# Patient Record
Sex: FEMALE | Race: WHITE | Employment: FULL TIME | URBAN - METROPOLITAN AREA
[De-identification: names, ages, dates, MRNs, and addresses within clinical notes are randomized per-mention and may not be internally consistent; named-entity substitution may affect disease eponyms.]

---

## 2017-02-10 ENCOUNTER — LAB ENCOUNTER (OUTPATIENT)
Dept: LAB | Age: 37
End: 2017-02-10
Attending: INTERNAL MEDICINE
Payer: COMMERCIAL

## 2017-02-10 ENCOUNTER — OFFICE VISIT (OUTPATIENT)
Dept: INTERNAL MEDICINE CLINIC | Facility: CLINIC | Age: 37
End: 2017-02-10

## 2017-02-10 VITALS
SYSTOLIC BLOOD PRESSURE: 122 MMHG | OXYGEN SATURATION: 97 % | HEART RATE: 72 BPM | BODY MASS INDEX: 26.31 KG/M2 | HEIGHT: 62 IN | DIASTOLIC BLOOD PRESSURE: 70 MMHG | TEMPERATURE: 98 F | WEIGHT: 143 LBS | RESPIRATION RATE: 16 BRPM

## 2017-02-10 DIAGNOSIS — R21 GENERALIZED MACULOPAPULAR RASH: Primary | ICD-10-CM

## 2017-02-10 DIAGNOSIS — R21 GENERALIZED MACULOPAPULAR RASH: ICD-10-CM

## 2017-02-10 LAB
ALBUMIN SERPL-MCNC: 4 G/DL (ref 3.5–4.8)
ALP LIVER SERPL-CCNC: 56 U/L (ref 37–98)
ALT SERPL-CCNC: 24 U/L (ref 14–54)
AST SERPL-CCNC: 21 U/L (ref 15–41)
BASOPHILS # BLD AUTO: 0.05 X10(3) UL (ref 0–0.1)
BASOPHILS NFR BLD AUTO: 0.8 %
BILIRUB SERPL-MCNC: 0.6 MG/DL (ref 0.1–2)
BUN BLD-MCNC: 13 MG/DL (ref 8–20)
CALCIUM BLD-MCNC: 9 MG/DL (ref 8.3–10.3)
CHLORIDE: 103 MMOL/L (ref 101–111)
CO2: 26 MMOL/L (ref 22–32)
CREAT BLD-MCNC: 0.72 MG/DL (ref 0.55–1.02)
EOSINOPHIL # BLD AUTO: 0.1 X10(3) UL (ref 0–0.3)
EOSINOPHIL NFR BLD AUTO: 1.6 %
ERYTHROCYTE [DISTWIDTH] IN BLOOD BY AUTOMATED COUNT: 11.6 % (ref 11.5–16)
GLUCOSE BLD-MCNC: 80 MG/DL (ref 70–99)
HCT VFR BLD AUTO: 40.2 % (ref 34–50)
HGB BLD-MCNC: 13.8 G/DL (ref 12–16)
IMMATURE GRANULOCYTE COUNT: 0.01 X10(3) UL (ref 0–1)
IMMATURE GRANULOCYTE RATIO %: 0.2 %
LYMPHOCYTES # BLD AUTO: 1.75 X10(3) UL (ref 0.9–4)
LYMPHOCYTES NFR BLD AUTO: 27.8 %
M PROTEIN MFR SERPL ELPH: 7.8 G/DL (ref 6.1–8.3)
MCH RBC QN AUTO: 32.2 PG (ref 27–33.2)
MCHC RBC AUTO-ENTMCNC: 34.3 G/DL (ref 31–37)
MCV RBC AUTO: 93.9 FL (ref 81–100)
MONOCYTES # BLD AUTO: 0.7 X10(3) UL (ref 0.1–0.6)
MONOCYTES NFR BLD AUTO: 11.1 %
NEUTROPHIL ABS PRELIM: 3.68 X10 (3) UL (ref 1.3–6.7)
NEUTROPHILS # BLD AUTO: 3.68 X10(3) UL (ref 1.3–6.7)
NEUTROPHILS NFR BLD AUTO: 58.5 %
PLATELET # BLD AUTO: 193 10(3)UL (ref 150–450)
POTASSIUM SERPL-SCNC: 4.2 MMOL/L (ref 3.6–5.1)
RBC # BLD AUTO: 4.28 X10(6)UL (ref 3.8–5.1)
RED CELL DISTRIBUTION WIDTH-SD: 39.3 FL (ref 35.1–46.3)
SODIUM SERPL-SCNC: 138 MMOL/L (ref 136–144)
WBC # BLD AUTO: 6.3 X10(3) UL (ref 4–13)

## 2017-02-10 PROCEDURE — 99213 OFFICE O/P EST LOW 20 MIN: CPT | Performed by: INTERNAL MEDICINE

## 2017-02-10 NOTE — PATIENT INSTRUCTIONS
For your rash:  - Use over the counter allergy medications - options include cetirizine, loratadine, or fexofenadine.  - I have also prescribed a steroid cream (triamicinolone). You can use this to help with your itching.   Apply wherever you are having it

## 2017-02-10 NOTE — PROGRESS NOTES
Lindsay Young is a 43year old female. HPI:   Patient presents with:  Rash: itchy on and off, raised bumps at times x 2 weeks  Patient presents with acute rash. It first appeared two weeks ago.   Started on stomach and back, but is now spreading to ar Alert and oriented, well developed, well nourished,in no apparent distress  SKIN: scattered macular rash on abdomen, upper extremities  HEENT: atraumatic, PERRLA, EOMI, normal lid and conjunctiva  LUNGS: clear to auscultation bilaterally, no wheezing/rubs

## 2017-11-10 ENCOUNTER — OFFICE VISIT (OUTPATIENT)
Dept: INTERNAL MEDICINE CLINIC | Facility: CLINIC | Age: 37
End: 2017-11-10

## 2017-11-10 ENCOUNTER — LAB ENCOUNTER (OUTPATIENT)
Dept: LAB | Age: 37
End: 2017-11-10
Attending: INTERNAL MEDICINE
Payer: COMMERCIAL

## 2017-11-10 VITALS
HEART RATE: 72 BPM | TEMPERATURE: 98 F | HEIGHT: 61.25 IN | RESPIRATION RATE: 17 BRPM | WEIGHT: 142.25 LBS | SYSTOLIC BLOOD PRESSURE: 126 MMHG | BODY MASS INDEX: 26.51 KG/M2 | DIASTOLIC BLOOD PRESSURE: 70 MMHG

## 2017-11-10 DIAGNOSIS — Z00.00 ENCOUNTER FOR PREVENTATIVE ADULT HEALTH CARE EXAMINATION: Primary | ICD-10-CM

## 2017-11-10 DIAGNOSIS — Z00.00 ENCOUNTER FOR PREVENTATIVE ADULT HEALTH CARE EXAMINATION: ICD-10-CM

## 2017-11-10 PROCEDURE — 36415 COLL VENOUS BLD VENIPUNCTURE: CPT | Performed by: INTERNAL MEDICINE

## 2017-11-10 PROCEDURE — 83036 HEMOGLOBIN GLYCOSYLATED A1C: CPT | Performed by: INTERNAL MEDICINE

## 2017-11-10 PROCEDURE — 99395 PREV VISIT EST AGE 18-39: CPT | Performed by: INTERNAL MEDICINE

## 2017-11-10 PROCEDURE — 80061 LIPID PANEL: CPT | Performed by: INTERNAL MEDICINE

## 2017-11-10 PROCEDURE — 80050 GENERAL HEALTH PANEL: CPT | Performed by: INTERNAL MEDICINE

## 2017-11-10 PROCEDURE — 90686 IIV4 VACC NO PRSV 0.5 ML IM: CPT | Performed by: INTERNAL MEDICINE

## 2017-11-10 PROCEDURE — 90471 IMMUNIZATION ADMIN: CPT | Performed by: INTERNAL MEDICINE

## 2017-11-10 NOTE — PATIENT INSTRUCTIONS
- Get blood work today. I will release the results to you on [employer] when they come back  - We gave you a flu shot today. We recommend you get a flu shot every year. - Continue to exercise at least 3 times a week, 30 minutes at a time.       It was a plea

## 2017-11-10 NOTE — PROGRESS NOTES
Geovanna Rosen is a 42year old female. HPI:   Patient presents with:  CPX  Patient presents for CPX/wellness examination. Walks, does yoga three times a week. Diet - tries to watch what she eats. No acute complaints at this time.       Past medica wheezing/rubs  CARDIO: RRR without murmurs. No clubbing, cyanosis or edema.   GI: soft non tender nondistended no hepatosplenomegaly, bowel sounds throughout  NEURO: CN II-XII intact, 5/5 strength all extremities  MS: Full ROM, no joint pain  PSYCH: pleasa

## 2018-10-25 ENCOUNTER — HOSPITAL ENCOUNTER (OUTPATIENT)
Dept: MAMMOGRAPHY | Age: 38
Discharge: HOME | End: 2018-10-25
Attending: INTERNAL MEDICINE
Payer: COMMERCIAL

## 2018-10-25 DIAGNOSIS — Z12.31 ENCOUNTER FOR SCREENING MAMMOGRAM FOR MALIGNANT NEOPLASM OF BREAST: ICD-10-CM

## 2018-10-25 PROCEDURE — 77063 BREAST TOMOSYNTHESIS BI: CPT | Performed by: INTERNAL MEDICINE

## 2018-10-25 PROCEDURE — 77067 SCR MAMMO BI INCL CAD: CPT | Performed by: INTERNAL MEDICINE

## 2018-11-30 ENCOUNTER — OFFICE VISIT (OUTPATIENT)
Dept: INTERNAL MEDICINE CLINIC | Facility: CLINIC | Age: 38
End: 2018-11-30
Payer: COMMERCIAL

## 2018-11-30 ENCOUNTER — LAB ENCOUNTER (OUTPATIENT)
Dept: LAB | Age: 38
End: 2018-11-30
Attending: INTERNAL MEDICINE
Payer: COMMERCIAL

## 2018-11-30 VITALS
DIASTOLIC BLOOD PRESSURE: 64 MMHG | HEART RATE: 66 BPM | HEIGHT: 62 IN | SYSTOLIC BLOOD PRESSURE: 118 MMHG | WEIGHT: 145 LBS | BODY MASS INDEX: 26.68 KG/M2

## 2018-11-30 DIAGNOSIS — Z12.4 SCREENING FOR CERVICAL CANCER: ICD-10-CM

## 2018-11-30 DIAGNOSIS — Z00.00 ROUTINE GENERAL MEDICAL EXAMINATION AT A HEALTH CARE FACILITY: Primary | ICD-10-CM

## 2018-11-30 DIAGNOSIS — Z00.00 ROUTINE GENERAL MEDICAL EXAMINATION AT A HEALTH CARE FACILITY: ICD-10-CM

## 2018-11-30 PROCEDURE — 36415 COLL VENOUS BLD VENIPUNCTURE: CPT | Performed by: INTERNAL MEDICINE

## 2018-11-30 PROCEDURE — 88175 CYTOPATH C/V AUTO FLUID REDO: CPT | Performed by: INTERNAL MEDICINE

## 2018-11-30 PROCEDURE — 99396 PREV VISIT EST AGE 40-64: CPT | Performed by: INTERNAL MEDICINE

## 2018-11-30 PROCEDURE — 80061 LIPID PANEL: CPT | Performed by: INTERNAL MEDICINE

## 2018-11-30 PROCEDURE — 80048 BASIC METABOLIC PNL TOTAL CA: CPT | Performed by: INTERNAL MEDICINE

## 2018-11-30 PROCEDURE — 84460 ALANINE AMINO (ALT) (SGPT): CPT | Performed by: INTERNAL MEDICINE

## 2018-11-30 PROCEDURE — 84450 TRANSFERASE (AST) (SGOT): CPT | Performed by: INTERNAL MEDICINE

## 2018-11-30 PROCEDURE — 85025 COMPLETE CBC W/AUTO DIFF WBC: CPT | Performed by: INTERNAL MEDICINE

## 2018-11-30 RX ORDER — ACETAMINOPHEN 160 MG
2000 TABLET,DISINTEGRATING ORAL DAILY
COMMUNITY

## 2018-11-30 NOTE — PROGRESS NOTES
Geovanna Rosen is a 34year old female. HPI:   Patient presents for physical exam.   1. Intermittent left shoulder and right knee pain: PT in 2016 helped greatly. She continues to do HEP and use nsaids sparingly. Symptoms are well controlled.        REV pleasant  EXTREMITIES: no cyanosis, clubbing or edema  BREAST: no masses, tenderness, nipple discharge, or axillary lad b/l  PELVIC: normal,healthy appearing cervix. There is white discharge present.      ASSESSMENT AND PLAN:   # L Shoulder Pain: well contr

## 2019-04-26 ENCOUNTER — HOSPITAL ENCOUNTER (OUTPATIENT)
Dept: GENERAL RADIOLOGY | Age: 39
Discharge: HOME | End: 2019-04-26
Attending: PHYSICIAN ASSISTANT
Payer: COMMERCIAL

## 2019-04-26 ENCOUNTER — OFFICE VISIT (OUTPATIENT)
Dept: INTERNAL MEDICINE CLINIC | Facility: CLINIC | Age: 39
End: 2019-04-26
Payer: COMMERCIAL

## 2019-04-26 VITALS
HEART RATE: 67 BPM | TEMPERATURE: 99 F | RESPIRATION RATE: 12 BRPM | WEIGHT: 147 LBS | SYSTOLIC BLOOD PRESSURE: 100 MMHG | OXYGEN SATURATION: 99 % | DIASTOLIC BLOOD PRESSURE: 58 MMHG | BODY MASS INDEX: 27.05 KG/M2 | HEIGHT: 62 IN

## 2019-04-26 DIAGNOSIS — M54.2 NECK PAIN ON RIGHT SIDE: ICD-10-CM

## 2019-04-26 DIAGNOSIS — M54.2 NECK PAIN ON RIGHT SIDE: Primary | ICD-10-CM

## 2019-04-26 PROCEDURE — 72052 X-RAY EXAM NECK SPINE 6/>VWS: CPT | Performed by: PHYSICIAN ASSISTANT

## 2019-04-26 PROCEDURE — 99214 OFFICE O/P EST MOD 30 MIN: CPT | Performed by: PHYSICIAN ASSISTANT

## 2019-04-26 RX ORDER — CYCLOBENZAPRINE HCL 10 MG
10 TABLET ORAL NIGHTLY PRN
Qty: 10 TABLET | Refills: 0 | Status: SHIPPED | OUTPATIENT
Start: 2019-04-26 | End: 2019-05-16

## 2019-04-26 RX ORDER — MELOXICAM 15 MG/1
15 TABLET ORAL DAILY PRN
Qty: 30 TABLET | Refills: 0 | Status: SHIPPED | OUTPATIENT
Start: 2019-04-26 | End: 2019-07-07

## 2019-04-26 NOTE — PATIENT INSTRUCTIONS
Neck pain:  - x-ray today  - start anti-inflammatory -- meloxicam -- 1 tablet daily with food x 5 days, then may take 1 tab daily with food as needed.     -- do not combine this with advil, motrin, ibuprofen, aleve, naproxen, excedrin, aspirin  - start musc

## 2019-04-26 NOTE — PROGRESS NOTES
HPI:   Paco Oliva is a 34year old female who is neck pain x 5-6 months. Denies injury. C/o R sided pain which radiates to her R posterior shoulder. Describes sharp pain which is worse with ROM of her neck.   Occ radiation to her shoulder, scapula, bilaterally, gross sensation intact, no focal deficits    A/P  # Chronic R sided neck pain: pt requesting XR. Discussed that this likely won't change our plan of care.   Rec meloxicam/tylenol prn, short term use of cyclobenzaprine, ice/heat, and referred t

## 2019-07-07 ENCOUNTER — APPOINTMENT (OUTPATIENT)
Dept: GENERAL RADIOLOGY | Age: 39
End: 2019-07-07
Attending: PHYSICIAN ASSISTANT
Payer: COMMERCIAL

## 2019-07-07 ENCOUNTER — HOSPITAL ENCOUNTER (EMERGENCY)
Age: 39
Discharge: HOME | End: 2019-07-07
Attending: EMERGENCY MEDICINE
Payer: COMMERCIAL

## 2019-07-07 VITALS
WEIGHT: 140 LBS | DIASTOLIC BLOOD PRESSURE: 67 MMHG | SYSTOLIC BLOOD PRESSURE: 109 MMHG | OXYGEN SATURATION: 99 % | TEMPERATURE: 97 F | RESPIRATION RATE: 16 BRPM | BODY MASS INDEX: 25.76 KG/M2 | HEIGHT: 62 IN | HEART RATE: 63 BPM

## 2019-07-07 DIAGNOSIS — S00.33XA CONTUSION OF NOSE, INITIAL ENCOUNTER: Primary | ICD-10-CM

## 2019-07-07 DIAGNOSIS — S09.90XA INJURY OF HEAD, INITIAL ENCOUNTER: ICD-10-CM

## 2019-07-07 DIAGNOSIS — S01.21XA LACERATION OF NOSE, INITIAL ENCOUNTER: ICD-10-CM

## 2019-07-07 PROCEDURE — 70160 X-RAY EXAM OF NASAL BONES: CPT | Performed by: PHYSICIAN ASSISTANT

## 2019-07-07 PROCEDURE — 99284 EMERGENCY DEPT VISIT MOD MDM: CPT | Performed by: EMERGENCY MEDICINE

## 2019-07-07 PROCEDURE — 90471 IMMUNIZATION ADMIN: CPT | Performed by: EMERGENCY MEDICINE

## 2019-07-07 PROCEDURE — 12011 RPR F/E/E/N/L/M 2.5 CM/<: CPT | Performed by: EMERGENCY MEDICINE

## 2019-07-08 NOTE — ED PROVIDER NOTES
Patient Seen in: [facility] In [place]    History   Patient presents with:  Laceration Abrasion (integumentary)    Stated Complaint: nasal lac, soccer hit glasses    47-year-old  female without significant past medical history Nose: Nose lacerations and sinus tenderness present. No mucosal edema, rhinorrhea, nasal deformity, septal deviation or nasal septal hematoma. No epistaxis. No foreign bodies.  Right sinus exhibits no maxillary sinus tenderness and no frontal sinus tendern There is no disposition time on file for this visit. Follow-up:  Liu Swain MD  [address] [id number] [phone]      Follow up on Monday. Call for appointment.         Medications Prescribed:  Current Discharge Med

## 2019-11-07 ENCOUNTER — IMMUNIZATION (OUTPATIENT)
Dept: FAMILY MEDICINE CLINIC | Facility: CLINIC | Age: 39
End: 2019-11-07
Payer: COMMERCIAL

## 2019-11-07 DIAGNOSIS — Z23 NEED FOR VACCINATION: ICD-10-CM

## 2019-11-07 PROCEDURE — 90686 IIV4 VACC NO PRSV 0.5 ML IM: CPT | Performed by: NURSE PRACTITIONER

## 2019-11-07 PROCEDURE — 90471 IMMUNIZATION ADMIN: CPT | Performed by: NURSE PRACTITIONER

## 2019-12-09 ENCOUNTER — OFFICE VISIT (OUTPATIENT)
Dept: INTERNAL MEDICINE CLINIC | Facility: CLINIC | Age: 39
End: 2019-12-09
Payer: COMMERCIAL

## 2019-12-09 VITALS
TEMPERATURE: 99 F | HEIGHT: 62 IN | OXYGEN SATURATION: 98 % | WEIGHT: 146 LBS | BODY MASS INDEX: 26.87 KG/M2 | DIASTOLIC BLOOD PRESSURE: 64 MMHG | HEART RATE: 76 BPM | RESPIRATION RATE: 16 BRPM | SYSTOLIC BLOOD PRESSURE: 108 MMHG

## 2019-12-09 DIAGNOSIS — J01.00 ACUTE MAXILLARY SINUSITIS, RECURRENCE NOT SPECIFIED: Primary | ICD-10-CM

## 2019-12-09 DIAGNOSIS — H66.92 ACUTE LEFT OTITIS MEDIA: ICD-10-CM

## 2019-12-09 PROCEDURE — 99213 OFFICE O/P EST LOW 20 MIN: CPT | Performed by: FAMILY MEDICINE

## 2019-12-09 RX ORDER — AMOXICILLIN AND CLAVULANATE POTASSIUM 875; 125 MG/1; MG/1
1 TABLET, FILM COATED ORAL 2 TIMES DAILY
Qty: 20 TABLET | Refills: 0 | Status: SHIPPED | OUTPATIENT
Start: 2019-12-09 | End: 2019-12-19

## 2019-12-09 NOTE — PROGRESS NOTES
CHIEF COMPLAINT:   Patient presents with:  Cold: started last week wednesday, Pt states she feels pain in her left ear and has congestion, cough, Fever this morning of 100. OTC mucinex and cold medication ([facility] brand).  Pt states she has low energy, irina normocephalic.  + tenderness on palpation of maxillary or frontal sinuses  EYES: conjunctiva clear, EOM intact  EARS: TM's right dull, left redden with effusion, + bulging on the left, no retraction,+ fluid  NOSE: Nostrils patent, mucoid nasal discharge, n

## 2019-12-17 ENCOUNTER — TELEPHONE (OUTPATIENT)
Dept: INTERNAL MEDICINE CLINIC | Facility: CLINIC | Age: 39
End: 2019-12-17

## 2019-12-17 ENCOUNTER — OFFICE VISIT (OUTPATIENT)
Dept: INTERNAL MEDICINE CLINIC | Facility: CLINIC | Age: 39
End: 2019-12-17
Payer: COMMERCIAL

## 2019-12-17 VITALS
RESPIRATION RATE: 14 BRPM | SYSTOLIC BLOOD PRESSURE: 108 MMHG | TEMPERATURE: 98 F | HEART RATE: 70 BPM | HEIGHT: 62 IN | OXYGEN SATURATION: 98 % | DIASTOLIC BLOOD PRESSURE: 72 MMHG | WEIGHT: 146 LBS | BODY MASS INDEX: 26.87 KG/M2

## 2019-12-17 DIAGNOSIS — H69.82 DYSFUNCTION OF LEFT EUSTACHIAN TUBE: Primary | ICD-10-CM

## 2019-12-17 DIAGNOSIS — J01.00 ACUTE MAXILLARY SINUSITIS, RECURRENCE NOT SPECIFIED: ICD-10-CM

## 2019-12-17 PROCEDURE — 99213 OFFICE O/P EST LOW 20 MIN: CPT | Performed by: FAMILY MEDICINE

## 2019-12-17 RX ORDER — PREDNISONE 20 MG/1
TABLET ORAL
Qty: 18 TABLET | Refills: 0 | Status: SHIPPED | OUTPATIENT
Start: 2019-12-17 | End: 2020-06-15

## 2019-12-17 NOTE — TELEPHONE ENCOUNTER
Patient called in requesting to speak to RN. Patient was seen last by Gera Green last week on 12/09/19 for fever and pain in left ear. Patient stated that she has been taking the antibiotic Amoxicillin-Pot Clavulanate 875-125 MG and is feeling better.   However,

## 2019-12-17 NOTE — PROGRESS NOTES
CHIEF COMPLAINT:   Patient presents with:  Hearing Check: Pt states she had pain the day she came. Pt states it is hard to hear, no pain but feels clogged. Pt states feels like she has a lot of liquid in Left ear.       HPI:   The patient complains of left maxillary or frontal sinuses  EYES: conjunctiva clear, EOM intact  EARS: TM's right clear,Left TM +effusion, no bulging, no retraction,+ fluid  NOSE: Nostrils patent,minimal clear nasal discharge, nasal mucosa pink  THROAT: Oral mucosa pink, moist. Posteri

## 2020-06-15 ENCOUNTER — APPOINTMENT (OUTPATIENT)
Dept: LAB | Age: 40
End: 2020-06-15
Attending: INTERNAL MEDICINE
Payer: COMMERCIAL

## 2020-06-15 ENCOUNTER — OFFICE VISIT (OUTPATIENT)
Dept: INTERNAL MEDICINE CLINIC | Facility: CLINIC | Age: 40
End: 2020-06-15
Payer: COMMERCIAL

## 2020-06-15 VITALS
OXYGEN SATURATION: 98 % | SYSTOLIC BLOOD PRESSURE: 112 MMHG | DIASTOLIC BLOOD PRESSURE: 62 MMHG | TEMPERATURE: 99 F | RESPIRATION RATE: 16 BRPM | BODY MASS INDEX: 26.87 KG/M2 | HEIGHT: 62 IN | HEART RATE: 88 BPM | WEIGHT: 146 LBS

## 2020-06-15 DIAGNOSIS — Z00.00 ROUTINE GENERAL MEDICAL EXAMINATION AT A HEALTH CARE FACILITY: ICD-10-CM

## 2020-06-15 DIAGNOSIS — Z00.00 ROUTINE GENERAL MEDICAL EXAMINATION AT A HEALTH CARE FACILITY: Primary | ICD-10-CM

## 2020-06-15 DIAGNOSIS — Z12.31 ENCOUNTER FOR SCREENING MAMMOGRAM FOR MALIGNANT NEOPLASM OF BREAST: ICD-10-CM

## 2020-06-15 DIAGNOSIS — Z20.822 EXPOSURE TO COVID-19 VIRUS: ICD-10-CM

## 2020-06-15 DIAGNOSIS — Z12.31 VISIT FOR SCREENING MAMMOGRAM: ICD-10-CM

## 2020-06-15 DIAGNOSIS — R19.05 PERIUMBILICAL MASS: ICD-10-CM

## 2020-06-15 PROCEDURE — 84460 ALANINE AMINO (ALT) (SGPT): CPT | Performed by: INTERNAL MEDICINE

## 2020-06-15 PROCEDURE — 80048 BASIC METABOLIC PNL TOTAL CA: CPT | Performed by: INTERNAL MEDICINE

## 2020-06-15 PROCEDURE — 84450 TRANSFERASE (AST) (SGOT): CPT | Performed by: INTERNAL MEDICINE

## 2020-06-15 PROCEDURE — 36415 COLL VENOUS BLD VENIPUNCTURE: CPT | Performed by: INTERNAL MEDICINE

## 2020-06-15 PROCEDURE — 99396 PREV VISIT EST AGE 40-64: CPT | Performed by: INTERNAL MEDICINE

## 2020-06-15 PROCEDURE — 80061 LIPID PANEL: CPT | Performed by: INTERNAL MEDICINE

## 2020-06-15 PROCEDURE — 86769 SARS-COV-2 COVID-19 ANTIBODY: CPT | Performed by: INTERNAL MEDICINE

## 2020-06-15 RX ORDER — MULTIVIT WITH CALCIUM,IRON,MIN
1 TABLET ORAL DAILY
COMMUNITY

## 2020-06-15 NOTE — PROGRESS NOTES
Juancarlos Eagle is a 37year old female. HPI:   Patient presents for a physical exam. She declines any issues. 1. Overweight: she exercises 2 times per week. Her nutrition is a lot of carbs, vegetables. Eats red meat 2 times per week.  Eats lentils/bean EXAM:      06/15/20  0759   BP: 112/62   Pulse: 88   Resp: 16   Temp: 98.8 °F (37.1 °C)   GENERAL: A&O well developed, well nourished,in no apparent distress  SKIN: no rashes,no suspicious lesions  HEENT: atraumatic, MMM, throat is clear  NECK: sup

## 2020-06-15 NOTE — PATIENT INSTRUCTIONS
You need 3 eight ounce servings of calcium/vitamin D daily. This includes spinach, kale, broccoli, almonds, milk, yogurt, or cottage cheese. Knee Pain  Knee pain is very common.  It’s especially common in active people who put a lot of pressure on the might be worse when you squat, run, or sit for a long time. Climbing stairs may be painful or hard to do. You might also sometimes feel like your knee is giving out. You may have symptoms in one or both of your knees.   Diagnosing knee pain  Your healthcare your knee as advised by your doctor or physical therapist  · Increase training gradually, and ease up on training when needed  · Have an expert check your gait for running or other sporting activities  · Stretch properly before and after exercise  · Replac

## 2020-06-18 ENCOUNTER — HOSPITAL ENCOUNTER (OUTPATIENT)
Dept: MAMMOGRAPHY | Age: 40
Discharge: HOME | End: 2020-06-18
Attending: INTERNAL MEDICINE
Payer: COMMERCIAL

## 2020-06-18 DIAGNOSIS — Z12.31 VISIT FOR SCREENING MAMMOGRAM: ICD-10-CM

## 2020-06-18 PROCEDURE — 77067 SCR MAMMO BI INCL CAD: CPT | Performed by: INTERNAL MEDICINE

## 2020-06-18 PROCEDURE — 77063 BREAST TOMOSYNTHESIS BI: CPT | Performed by: INTERNAL MEDICINE

## 2021-01-19 PROCEDURE — 88175 CYTOPATH C/V AUTO FLUID REDO: CPT | Performed by: NURSE PRACTITIONER

## 2021-01-19 PROCEDURE — 87624 HPV HI-RISK TYP POOLED RSLT: CPT | Performed by: NURSE PRACTITIONER

## 2021-08-24 ENCOUNTER — HOSPITAL ENCOUNTER (OUTPATIENT)
Dept: MAMMOGRAPHY | Age: 41
Discharge: HOME | End: 2021-08-24
Attending: NURSE PRACTITIONER
Payer: COMMERCIAL

## 2021-08-24 DIAGNOSIS — Z12.31 VISIT FOR SCREENING MAMMOGRAM: ICD-10-CM

## 2021-08-24 PROCEDURE — 77063 BREAST TOMOSYNTHESIS BI: CPT | Performed by: NURSE PRACTITIONER

## 2021-08-24 PROCEDURE — 77067 SCR MAMMO BI INCL CAD: CPT | Performed by: NURSE PRACTITIONER

## 2022-04-20 ENCOUNTER — OFFICE VISIT (OUTPATIENT)
Dept: INTERNAL MEDICINE CLINIC | Facility: CLINIC | Age: 42
End: 2022-04-20
Payer: COMMERCIAL

## 2022-04-20 ENCOUNTER — LAB ENCOUNTER (OUTPATIENT)
Dept: LAB | Age: 42
End: 2022-04-20
Attending: INTERNAL MEDICINE
Payer: COMMERCIAL

## 2022-04-20 VITALS
SYSTOLIC BLOOD PRESSURE: 100 MMHG | OXYGEN SATURATION: 98 % | RESPIRATION RATE: 16 BRPM | HEART RATE: 60 BPM | TEMPERATURE: 98 F | BODY MASS INDEX: 28.31 KG/M2 | WEIGHT: 153.81 LBS | DIASTOLIC BLOOD PRESSURE: 52 MMHG | HEIGHT: 62 IN

## 2022-04-20 DIAGNOSIS — E66.3 OVERWEIGHT (BMI 25.0-29.9): ICD-10-CM

## 2022-04-20 DIAGNOSIS — Z00.00 ROUTINE GENERAL MEDICAL EXAMINATION AT A HEALTH CARE FACILITY: ICD-10-CM

## 2022-04-20 DIAGNOSIS — Z00.00 ROUTINE GENERAL MEDICAL EXAMINATION AT A HEALTH CARE FACILITY: Primary | ICD-10-CM

## 2022-04-20 DIAGNOSIS — Z12.31 VISIT FOR SCREENING MAMMOGRAM: ICD-10-CM

## 2022-04-20 LAB
ALT SERPL-CCNC: 21 U/L
ANION GAP SERPL CALC-SCNC: 5 MMOL/L (ref 0–18)
AST SERPL-CCNC: 17 U/L (ref 15–37)
BUN BLD-MCNC: 12 MG/DL (ref 7–18)
CALCIUM BLD-MCNC: 8.9 MG/DL (ref 8.5–10.1)
CHLORIDE SERPL-SCNC: 107 MMOL/L (ref 98–112)
CHOLEST SERPL-MCNC: 189 MG/DL (ref ?–200)
CO2 SERPL-SCNC: 27 MMOL/L (ref 21–32)
CREAT BLD-MCNC: 0.62 MG/DL
FASTING PATIENT LIPID ANSWER: YES
FASTING STATUS PATIENT QL REPORTED: YES
GLUCOSE BLD-MCNC: 91 MG/DL (ref 70–99)
HCV AB SERPL QL IA: NONREACTIVE
HDLC SERPL-MCNC: 77 MG/DL (ref 40–59)
LDLC SERPL CALC-MCNC: 99 MG/DL (ref ?–100)
NONHDLC SERPL-MCNC: 112 MG/DL (ref ?–130)
OSMOLALITY SERPL CALC.SUM OF ELEC: 287 MOSM/KG (ref 275–295)
POTASSIUM SERPL-SCNC: 4.5 MMOL/L (ref 3.5–5.1)
SODIUM SERPL-SCNC: 139 MMOL/L (ref 136–145)
TRIGL SERPL-MCNC: 69 MG/DL (ref 30–149)
VLDLC SERPL CALC-MCNC: 11 MG/DL (ref 0–30)

## 2022-04-20 PROCEDURE — 3074F SYST BP LT 130 MM HG: CPT | Performed by: INTERNAL MEDICINE

## 2022-04-20 PROCEDURE — 80048 BASIC METABOLIC PNL TOTAL CA: CPT

## 2022-04-20 PROCEDURE — 84460 ALANINE AMINO (ALT) (SGPT): CPT

## 2022-04-20 PROCEDURE — 36415 COLL VENOUS BLD VENIPUNCTURE: CPT

## 2022-04-20 PROCEDURE — 84450 TRANSFERASE (AST) (SGOT): CPT

## 2022-04-20 PROCEDURE — 80061 LIPID PANEL: CPT

## 2022-04-20 PROCEDURE — 3078F DIAST BP <80 MM HG: CPT | Performed by: INTERNAL MEDICINE

## 2022-04-20 PROCEDURE — 3008F BODY MASS INDEX DOCD: CPT | Performed by: INTERNAL MEDICINE

## 2022-04-20 PROCEDURE — 86803 HEPATITIS C AB TEST: CPT

## 2022-04-20 PROCEDURE — 99396 PREV VISIT EST AGE 40-64: CPT | Performed by: INTERNAL MEDICINE

## 2022-09-15 ENCOUNTER — HOSPITAL ENCOUNTER (OUTPATIENT)
Dept: MAMMOGRAPHY | Age: 42
Discharge: HOME | End: 2022-09-15
Attending: INTERNAL MEDICINE
Payer: COMMERCIAL

## 2022-09-15 DIAGNOSIS — Z12.31 VISIT FOR SCREENING MAMMOGRAM: ICD-10-CM

## 2022-09-15 PROCEDURE — 77063 BREAST TOMOSYNTHESIS BI: CPT | Performed by: INTERNAL MEDICINE

## 2022-09-15 PROCEDURE — 77067 SCR MAMMO BI INCL CAD: CPT | Performed by: INTERNAL MEDICINE

## 2023-04-20 ENCOUNTER — LAB ENCOUNTER (OUTPATIENT)
Dept: LAB | Age: 43
End: 2023-04-20
Attending: INTERNAL MEDICINE
Payer: COMMERCIAL

## 2023-04-20 ENCOUNTER — OFFICE VISIT (OUTPATIENT)
Dept: INTERNAL MEDICINE CLINIC | Facility: CLINIC | Age: 43
End: 2023-04-20
Payer: COMMERCIAL

## 2023-04-20 VITALS
WEIGHT: 155.19 LBS | HEART RATE: 71 BPM | RESPIRATION RATE: 15 BRPM | TEMPERATURE: 98 F | OXYGEN SATURATION: 97 % | BODY MASS INDEX: 28.56 KG/M2 | SYSTOLIC BLOOD PRESSURE: 112 MMHG | DIASTOLIC BLOOD PRESSURE: 58 MMHG | HEIGHT: 62 IN

## 2023-04-20 DIAGNOSIS — Z12.11 COLON CANCER SCREENING: ICD-10-CM

## 2023-04-20 DIAGNOSIS — Z00.00 ANNUAL PHYSICAL EXAM: ICD-10-CM

## 2023-04-20 DIAGNOSIS — Z71.85 IMMUNIZATION COUNSELING: ICD-10-CM

## 2023-04-20 DIAGNOSIS — Z00.00 ANNUAL PHYSICAL EXAM: Primary | ICD-10-CM

## 2023-04-20 DIAGNOSIS — Z12.31 ENCOUNTER FOR SCREENING MAMMOGRAM FOR MALIGNANT NEOPLASM OF BREAST: ICD-10-CM

## 2023-04-20 LAB
ALBUMIN SERPL-MCNC: 3.7 G/DL (ref 3.4–5)
ALBUMIN/GLOB SERPL: 0.9 {RATIO} (ref 1–2)
ALP LIVER SERPL-CCNC: 53 U/L
ALT SERPL-CCNC: 29 U/L
ANION GAP SERPL CALC-SCNC: 2 MMOL/L (ref 0–18)
AST SERPL-CCNC: 24 U/L (ref 15–37)
BASOPHILS # BLD AUTO: 0.04 X10(3) UL (ref 0–0.2)
BASOPHILS NFR BLD AUTO: 0.8 %
BILIRUB SERPL-MCNC: 0.5 MG/DL (ref 0.1–2)
BUN BLD-MCNC: 11 MG/DL (ref 7–18)
CALCIUM BLD-MCNC: 8.8 MG/DL (ref 8.5–10.1)
CHLORIDE SERPL-SCNC: 108 MMOL/L (ref 98–112)
CHOLEST SERPL-MCNC: 179 MG/DL (ref ?–200)
CO2 SERPL-SCNC: 26 MMOL/L (ref 21–32)
CREAT BLD-MCNC: 0.68 MG/DL
EOSINOPHIL # BLD AUTO: 0.1 X10(3) UL (ref 0–0.7)
EOSINOPHIL NFR BLD AUTO: 1.9 %
ERYTHROCYTE [DISTWIDTH] IN BLOOD BY AUTOMATED COUNT: 11.8 %
EST. AVERAGE GLUCOSE BLD GHB EST-MCNC: 103 MG/DL (ref 68–126)
FASTING PATIENT LIPID ANSWER: YES
FASTING STATUS PATIENT QL REPORTED: YES
GFR SERPLBLD BASED ON 1.73 SQ M-ARVRAT: 110 ML/MIN/1.73M2 (ref 60–?)
GLOBULIN PLAS-MCNC: 3.9 G/DL (ref 2.8–4.4)
GLUCOSE BLD-MCNC: 89 MG/DL (ref 70–99)
HBA1C MFR BLD: 5.2 % (ref ?–5.7)
HBV SURFACE AB SER QL: NONREACTIVE
HBV SURFACE AB SERPL IA-ACNC: <3.1 MIU/ML
HCT VFR BLD AUTO: 40.1 %
HDLC SERPL-MCNC: 79 MG/DL (ref 40–59)
HGB BLD-MCNC: 13.3 G/DL
IMM GRANULOCYTES # BLD AUTO: 0.01 X10(3) UL (ref 0–1)
IMM GRANULOCYTES NFR BLD: 0.2 %
LDLC SERPL CALC-MCNC: 88 MG/DL (ref ?–100)
LYMPHOCYTES # BLD AUTO: 1.28 X10(3) UL (ref 1–4)
LYMPHOCYTES NFR BLD AUTO: 24.3 %
MCH RBC QN AUTO: 31.9 PG (ref 26–34)
MCHC RBC AUTO-ENTMCNC: 33.2 G/DL (ref 31–37)
MCV RBC AUTO: 96.2 FL
MONOCYTES # BLD AUTO: 0.39 X10(3) UL (ref 0.1–1)
MONOCYTES NFR BLD AUTO: 7.4 %
NEUTROPHILS # BLD AUTO: 3.44 X10 (3) UL (ref 1.5–7.7)
NEUTROPHILS # BLD AUTO: 3.44 X10(3) UL (ref 1.5–7.7)
NEUTROPHILS NFR BLD AUTO: 65.4 %
NONHDLC SERPL-MCNC: 100 MG/DL (ref ?–130)
OSMOLALITY SERPL CALC.SUM OF ELEC: 281 MOSM/KG (ref 275–295)
PLATELET # BLD AUTO: 211 10(3)UL (ref 150–450)
POTASSIUM SERPL-SCNC: 4 MMOL/L (ref 3.5–5.1)
PROT SERPL-MCNC: 7.6 G/DL (ref 6.4–8.2)
RBC # BLD AUTO: 4.17 X10(6)UL
RUBV IGG SER QL: POSITIVE
RUBV IGG SER-ACNC: 108.7 IU/ML (ref 10–?)
SODIUM SERPL-SCNC: 136 MMOL/L (ref 136–145)
TRIGL SERPL-MCNC: 63 MG/DL (ref 30–149)
TSI SER-ACNC: 1.79 MIU/ML (ref 0.36–3.74)
VIT D+METAB SERPL-MCNC: 38.1 NG/ML (ref 30–100)
VLDLC SERPL CALC-MCNC: 10 MG/DL (ref 0–30)
WBC # BLD AUTO: 5.3 X10(3) UL (ref 4–11)

## 2023-04-20 PROCEDURE — 99396 PREV VISIT EST AGE 40-64: CPT | Performed by: INTERNAL MEDICINE

## 2023-04-20 PROCEDURE — 80050 GENERAL HEALTH PANEL: CPT | Performed by: INTERNAL MEDICINE

## 2023-04-20 PROCEDURE — 86765 RUBEOLA ANTIBODY: CPT | Performed by: INTERNAL MEDICINE

## 2023-04-20 PROCEDURE — 82306 VITAMIN D 25 HYDROXY: CPT | Performed by: INTERNAL MEDICINE

## 2023-04-20 PROCEDURE — 86762 RUBELLA ANTIBODY: CPT | Performed by: INTERNAL MEDICINE

## 2023-04-20 PROCEDURE — 83036 HEMOGLOBIN GLYCOSYLATED A1C: CPT | Performed by: INTERNAL MEDICINE

## 2023-04-20 PROCEDURE — 86735 MUMPS ANTIBODY: CPT | Performed by: INTERNAL MEDICINE

## 2023-04-20 PROCEDURE — 3074F SYST BP LT 130 MM HG: CPT | Performed by: INTERNAL MEDICINE

## 2023-04-20 PROCEDURE — 86787 VARICELLA-ZOSTER ANTIBODY: CPT | Performed by: INTERNAL MEDICINE

## 2023-04-20 PROCEDURE — 86706 HEP B SURFACE ANTIBODY: CPT | Performed by: INTERNAL MEDICINE

## 2023-04-20 PROCEDURE — 80061 LIPID PANEL: CPT | Performed by: INTERNAL MEDICINE

## 2023-04-20 PROCEDURE — 3008F BODY MASS INDEX DOCD: CPT | Performed by: INTERNAL MEDICINE

## 2023-04-20 PROCEDURE — 3078F DIAST BP <80 MM HG: CPT | Performed by: INTERNAL MEDICINE

## 2023-04-21 LAB
MEV IGG SER-ACNC: >300 AU/ML (ref 16.5–?)
MUV IGG SER IA-ACNC: 44.7 AU/ML (ref 11–?)
VZV IGG SER IA-ACNC: 520 (ref 165–?)

## 2023-04-24 DIAGNOSIS — Z23 NEED VACCINATION-VIRAL DISEASE: Primary | ICD-10-CM

## 2023-04-26 DIAGNOSIS — Z23 NEED VACCINATION-VIRAL DISEASE: ICD-10-CM

## 2023-04-26 NOTE — PROGRESS NOTES
Orders placed for Hep B series per DV result note on 4/24/2023:    Emily Leal MD   4/21/2023 12:00 PM CDT       Results reviewed. Please inform patient that cbc/cmp/cholesterol/thyroid labs unremarkable. Does have immunity to measles, mumps, rubella, and varicella. However does not have immunity to hep b; should get hep b vaccine series.

## 2023-04-27 ENCOUNTER — NURSE ONLY (OUTPATIENT)
Dept: INTERNAL MEDICINE CLINIC | Facility: CLINIC | Age: 43
End: 2023-04-27
Payer: COMMERCIAL

## 2023-04-27 ENCOUNTER — TELEPHONE (OUTPATIENT)
Dept: INTERNAL MEDICINE CLINIC | Facility: CLINIC | Age: 43
End: 2023-04-27

## 2023-04-27 PROCEDURE — 90471 IMMUNIZATION ADMIN: CPT | Performed by: INTERNAL MEDICINE

## 2023-04-27 PROCEDURE — 90746 HEPB VACCINE 3 DOSE ADULT IM: CPT | Performed by: INTERNAL MEDICINE

## 2023-04-27 NOTE — TELEPHONE ENCOUNTER
Future Appointments   Date Time Provider Zac Mccloud   5/25/2023  9:30 AM [facility] 08 NURSE [facility] [place]     Pt scheduled [facility] for 2nd Hep B. Place orders if needed.

## 2023-05-25 ENCOUNTER — NURSE ONLY (OUTPATIENT)
Dept: INTERNAL MEDICINE CLINIC | Facility: CLINIC | Age: 43
End: 2023-05-25
Payer: COMMERCIAL

## 2023-05-25 PROCEDURE — 90471 IMMUNIZATION ADMIN: CPT | Performed by: INTERNAL MEDICINE

## 2023-05-25 PROCEDURE — 90746 HEPB VACCINE 3 DOSE ADULT IM: CPT | Performed by: INTERNAL MEDICINE

## 2023-07-20 ENCOUNTER — NURSE ONLY (OUTPATIENT)
Dept: INTERNAL MEDICINE CLINIC | Facility: CLINIC | Age: 43
End: 2023-07-20
Payer: COMMERCIAL

## 2023-08-14 ENCOUNTER — HOSPITAL ENCOUNTER (OUTPATIENT)
Dept: GENERAL RADIOLOGY | Age: 43
Discharge: HOME | End: 2023-08-14
Attending: INTERNAL MEDICINE
Payer: COMMERCIAL

## 2023-08-14 ENCOUNTER — OFFICE VISIT (OUTPATIENT)
Dept: INTERNAL MEDICINE CLINIC | Facility: CLINIC | Age: 43
End: 2023-08-14
Payer: COMMERCIAL

## 2023-08-14 VITALS
WEIGHT: 158 LBS | BODY MASS INDEX: 29 KG/M2 | DIASTOLIC BLOOD PRESSURE: 62 MMHG | SYSTOLIC BLOOD PRESSURE: 124 MMHG | OXYGEN SATURATION: 98 % | TEMPERATURE: 97 F | RESPIRATION RATE: 15 BRPM | HEART RATE: 76 BPM

## 2023-08-14 DIAGNOSIS — M25.561 RIGHT KNEE PAIN, UNSPECIFIED CHRONICITY: ICD-10-CM

## 2023-08-14 DIAGNOSIS — S16.1XXA STRAIN OF NECK MUSCLE, INITIAL ENCOUNTER: ICD-10-CM

## 2023-08-14 DIAGNOSIS — M23.8X2 CREPITUS OF JOINT OF LEFT KNEE: ICD-10-CM

## 2023-08-14 DIAGNOSIS — L30.9 DERMATITIS: ICD-10-CM

## 2023-08-14 DIAGNOSIS — M25.561 RIGHT KNEE PAIN, UNSPECIFIED CHRONICITY: Primary | ICD-10-CM

## 2023-08-14 PROCEDURE — 99214 OFFICE O/P EST MOD 30 MIN: CPT | Performed by: INTERNAL MEDICINE

## 2023-08-14 PROCEDURE — 3078F DIAST BP <80 MM HG: CPT | Performed by: INTERNAL MEDICINE

## 2023-08-14 PROCEDURE — 73562 X-RAY EXAM OF KNEE 3: CPT | Performed by: INTERNAL MEDICINE

## 2023-08-14 PROCEDURE — 3074F SYST BP LT 130 MM HG: CPT | Performed by: INTERNAL MEDICINE

## 2023-08-14 RX ORDER — MELOXICAM 15 MG/1
15 TABLET ORAL DAILY PRN
Qty: 30 TABLET | Refills: 0 | Status: SHIPPED | OUTPATIENT
Start: 2023-08-14

## 2023-08-14 RX ORDER — TRIAMCINOLONE ACETONIDE 1 MG/G
CREAM TOPICAL 2 TIMES DAILY PRN
Qty: 60 G | Refills: 0 | Status: SHIPPED | OUTPATIENT
Start: 2023-08-14 | End: 2023-08-28

## 2023-08-14 RX ORDER — CYCLOBENZAPRINE HCL 5 MG
5 TABLET ORAL EVERY 8 HOURS PRN
Qty: 15 TABLET | Refills: 0 | Status: SHIPPED | OUTPATIENT
Start: 2023-08-14

## 2023-11-02 ENCOUNTER — HOSPITAL ENCOUNTER (OUTPATIENT)
Dept: MAMMOGRAPHY | Age: 43
Discharge: HOME | End: 2023-11-02
Attending: INTERNAL MEDICINE
Payer: COMMERCIAL

## 2023-11-02 DIAGNOSIS — Z12.31 ENCOUNTER FOR SCREENING MAMMOGRAM FOR MALIGNANT NEOPLASM OF BREAST: ICD-10-CM

## 2023-11-02 DIAGNOSIS — R92.30 DENSE BREAST TISSUE ON MAMMOGRAM, UNSPECIFIED TYPE: Primary | ICD-10-CM

## 2023-11-02 DIAGNOSIS — Z00.00 ANNUAL PHYSICAL EXAM: ICD-10-CM

## 2023-11-02 PROCEDURE — 77063 BREAST TOMOSYNTHESIS BI: CPT | Performed by: INTERNAL MEDICINE

## 2023-11-02 PROCEDURE — 77067 SCR MAMMO BI INCL CAD: CPT | Performed by: INTERNAL MEDICINE

## 2024-02-23 ENCOUNTER — HOSPITAL ENCOUNTER (OUTPATIENT)
Dept: ULTRASOUND IMAGING | Age: 44
Discharge: HOME | End: 2024-02-23
Attending: INTERNAL MEDICINE
Payer: COMMERCIAL

## 2024-02-23 DIAGNOSIS — R92.30 DENSE BREAST TISSUE ON MAMMOGRAM, UNSPECIFIED TYPE: ICD-10-CM

## 2024-02-23 PROCEDURE — 76641 ULTRASOUND BREAST COMPLETE: CPT | Performed by: INTERNAL MEDICINE

## 2024-05-15 ENCOUNTER — OFFICE VISIT (OUTPATIENT)
Facility: CLINIC | Age: 44
End: 2024-05-15

## 2024-05-15 VITALS
RESPIRATION RATE: 16 BRPM | TEMPERATURE: 97 F | BODY MASS INDEX: 28 KG/M2 | HEART RATE: 78 BPM | SYSTOLIC BLOOD PRESSURE: 138 MMHG | OXYGEN SATURATION: 100 % | WEIGHT: 154 LBS | DIASTOLIC BLOOD PRESSURE: 89 MMHG

## 2024-05-15 DIAGNOSIS — N60.01 CYST OF RIGHT BREAST: Primary | ICD-10-CM

## 2024-05-15 PROCEDURE — 99243 OFF/OP CNSLTJ NEW/EST LOW 30: CPT | Performed by: SURGERY

## 2024-05-15 NOTE — CONSULTS
Breast Surgery New Patient Consultation    Judy Guy is a 43 year old patient, referred by Dr. Smith, who presents for evaluation of right breast cyst seen on ultrasound.    History of Present Illness:   Ms. Judy Guy is a 43 year old woman with a past history significant for osteoarthritis who presents for evaluation of right breast cyst on ultrasound.     She underwent screening mammogram on 2023 which showed BI-RADS 1 assessment.  Her breasts were categorized as heterogeneously dense, category C.  She underwent bilateral screening ultrasound on 2024.  This showed a complicated cyst at the 11 o'clock position of the retroareolar right breast measuring 2 x 2 x 3 mm.    She denies any palpable masses, nipple discharge, skin changes, or axillary adenopathy. She does not have breast pain. She does not have significant past history for breast diseases or breast biopsy. She does not have family history of breast cancer. She does not have a history of genetic testing.          Past Medical History:    Osteoarthritis       Past Surgical History:   Procedure Laterality Date          x2       Gynecological History:  Menarche at age 11 and LMP 2024  Pt is a   Pt was 22 years old at time of first pregnancy.    She has cumulative breastfeeding history of 10 months  Age of Menopause: n/a   She denies any history of hormone replacement therapy  She has history of oral contraceptive use for 4 years, last .   She denies infertility treatment to achieve pregnancy.    Medications:     cyclobenzaprine 5 MG Oral Tab Take 1 tablet (5 mg total) by mouth every 8 (eight) hours as needed for Muscle spasms. 15 tablet 0       Allergies:    No Known Allergies    Family History:   Family History   Problem Relation Age of Onset    Cancer Father         lymphoma    Diabetes Mother     Hypertension Mother        She is not of Ashkenazi [employer] ancestry.    Social History:  History   Alcohol Use    4.0 standard  drinks of alcohol/week    2 Glasses of wine, 2 Standard drinks or equivalent per week     Comment: 2 drinks/week occ       History   Smoking Status    Never   Smokeless Tobacco    Never       Review of Systems:    Review of Systems   Constitutional:  Negative for activity change, appetite change, chills, fatigue and unexpected weight change.   HENT:  Negative for ear pain, hearing loss, nosebleeds, sore throat, trouble swallowing and voice change.    Eyes:  Negative for pain and visual disturbance.   Respiratory:  Negative for cough, chest tightness and shortness of breath.    Cardiovascular:  Negative for chest pain, palpitations and leg swelling.   Gastrointestinal:  Negative for nausea, vomiting, abdominal pain, diarrhea, constipation and blood in stool.   Endocrine: Negative for cold intolerance and heat intolerance.   Genitourinary:  Negative for dysuria, hematuria and difficulty urinating.   Musculoskeletal:  Positive for joint pain and neck pain. Negative for back pain and joint swelling.   Skin:  Negative for color change, rash and wound.   Allergic/Immunologic: Negative for environmental allergies.   Neurological:  Negative for tremors, syncope, facial asymmetry, speech difficulty and weakness.   Hematological:  Negative for adenopathy. Does not bruise/bleed easily.   Psychiatric/Behavioral:  Negative for hallucinations, behavioral problems, confusion, agitation and depressed mood.       Otherwise as per HPI.    Physical Exam:    /89 (BP Location: Right arm, Patient Position: Sitting, Cuff Size: adult)   Pulse 78   Temp 97.4 °F (36.3 °C)   Resp 16   Wt 69.9 kg (154 lb)   LMP 08/12/2023   SpO2 100%   BMI 28.17 kg/m²     Physical Exam  Vitals reviewed.   Constitutional:       Appearance: Normal appearance.   HENT:      Head: Normocephalic and atraumatic.   Eyes:      Extraocular Movements: Extraocular movements intact.      Pupils: Pupils are equal, round, and reactive to light.   Cardiovascular:       Rate and Rhythm: Normal rate.   Pulmonary:      Effort: Pulmonary effort is normal.   Chest:   Breasts:     Right: Normal. No mass, nipple discharge, skin change or tenderness.      Left: Normal. No mass, nipple discharge, skin change or tenderness.   Abdominal:      General: Abdomen is flat.      Palpations: Abdomen is soft.   Musculoskeletal:         General: Normal range of motion.      Cervical back: Normal range of motion and neck supple.   Lymphadenopathy:      Upper Body:      Right upper body: No supraclavicular or axillary adenopathy.      Left upper body: No supraclavicular or axillary adenopathy.   Skin:     General: Skin is warm and dry.   Neurological:      General: No focal deficit present.      Mental Status: She is alert and oriented to person, place, and time.   Psychiatric:         Mood and Affect: Mood normal.          Bra size: 38B    Labs/imaging: reviewed in EPIC    Impression:   Ms. Judy Guy is a 43 year old woman that presents for evaluation of right breast cyst seen on ultrasound.         Discussion and Plan:  I had a discussion with the Patient regarding her breast exam. On exam today I found no evidence of disease. I personally reviewed her recent mammogram and ultrasound and we discussed this.    I discussed with the patient that a complicated cyst requires no intervention at this time.  Her cyst is very small, nonpalpable, and asymptomatic.  I recommended she continue with annual mammogram and annual whole breast ultrasound due to her dense breast tissue.  She can return to see me if there is any further abnormality or if the cyst substantially increases in size.  I explained that we would intervene if the cyst was very large, causing symptoms, causing skin redness, or had signs of infection. Of note, I did calculate her TC score and she is not high risk for breast cancer at this time (lifetime risk 9.2%).     Further screening recommendations:   Continue annual mammogram  (next Nov 2024)  Continue annual bilateral whole breast ultrasound due to dense breasts (next Feb 2024)     She was given ample opportunity for questions and those questions were answered to her satisfaction. She has been encouraged to contact the office with any questions or concerns prior to her next appointment.     This encounter lasted a total of 30 minutes, more than 50% of which was dedicated to the discussion of management options.     Steven Jordan MD  Breast Surgical Oncology    CC: Dr. Smith

## 2024-05-15 NOTE — PATIENT INSTRUCTIONS
Continue annual mammogram (next Nov 2024)  Continue annual bilateral whole breast ultrasound due to dense breasts (next Feb 2024)   Return to breast clinic if cyst substantially enlarges on imaging or starts causing symptoms  Will let PCP continue to order imaging at this time  Please call with any questions

## 2024-07-11 ENCOUNTER — LAB ENCOUNTER (OUTPATIENT)
Dept: LAB | Age: 44
End: 2024-07-11
Attending: INTERNAL MEDICINE
Payer: COMMERCIAL

## 2024-07-11 ENCOUNTER — OFFICE VISIT (OUTPATIENT)
Dept: INTERNAL MEDICINE CLINIC | Facility: CLINIC | Age: 44
End: 2024-07-11
Payer: COMMERCIAL

## 2024-07-11 VITALS
SYSTOLIC BLOOD PRESSURE: 108 MMHG | WEIGHT: 157 LBS | DIASTOLIC BLOOD PRESSURE: 58 MMHG | RESPIRATION RATE: 15 BRPM | HEART RATE: 71 BPM | BODY MASS INDEX: 28.89 KG/M2 | TEMPERATURE: 98 F | OXYGEN SATURATION: 99 % | HEIGHT: 62 IN

## 2024-07-11 DIAGNOSIS — N92.6 MISSED MENSES: ICD-10-CM

## 2024-07-11 DIAGNOSIS — Z12.31 ENCOUNTER FOR SCREENING MAMMOGRAM FOR MALIGNANT NEOPLASM OF BREAST: ICD-10-CM

## 2024-07-11 DIAGNOSIS — R14.0 ABDOMINAL BLOATING: ICD-10-CM

## 2024-07-11 DIAGNOSIS — Z00.00 ANNUAL PHYSICAL EXAM: Primary | ICD-10-CM

## 2024-07-11 DIAGNOSIS — Z00.00 ANNUAL PHYSICAL EXAM: ICD-10-CM

## 2024-07-11 DIAGNOSIS — N89.8 VAGINAL ITCHING: ICD-10-CM

## 2024-07-11 DIAGNOSIS — N60.09 CYST OF BREAST, UNSPECIFIED LATERALITY: ICD-10-CM

## 2024-07-11 DIAGNOSIS — Z12.11 COLON CANCER SCREENING: ICD-10-CM

## 2024-07-11 DIAGNOSIS — R35.0 URINARY FREQUENCY: ICD-10-CM

## 2024-07-11 DIAGNOSIS — R19.8 ABDOMINAL/PELVIC SYMPTOM: ICD-10-CM

## 2024-07-11 DIAGNOSIS — Z12.4 CERVICAL CANCER SCREENING: ICD-10-CM

## 2024-07-11 LAB
ALBUMIN SERPL-MCNC: 4.4 G/DL (ref 3.2–4.8)
ALBUMIN/GLOB SERPL: 1.5 {RATIO} (ref 1–2)
ALP LIVER SERPL-CCNC: 68 U/L
ALT SERPL-CCNC: 28 U/L
ANION GAP SERPL CALC-SCNC: 5 MMOL/L (ref 0–18)
APPEARANCE: CLEAR
AST SERPL-CCNC: 30 U/L (ref ?–34)
BASOPHILS # BLD AUTO: 0.04 X10(3) UL (ref 0–0.2)
BASOPHILS NFR BLD AUTO: 0.7 %
BILIRUB SERPL-MCNC: 0.7 MG/DL (ref 0.3–1.2)
BILIRUBIN: NEGATIVE
BUN BLD-MCNC: 10 MG/DL (ref 9–23)
BUN/CREAT SERPL: 14.7 (ref 10–20)
CALCIUM BLD-MCNC: 9.6 MG/DL (ref 8.7–10.4)
CHLORIDE SERPL-SCNC: 107 MMOL/L (ref 98–112)
CHOLEST SERPL-MCNC: 209 MG/DL (ref ?–200)
CO2 SERPL-SCNC: 28 MMOL/L (ref 21–32)
CONTROL LINE PRESENT WITH A CLEAR BACKGROUND (YES/NO): YES YES/NO
CREAT BLD-MCNC: 0.68 MG/DL
DEPRECATED RDW RBC AUTO: 41.5 FL (ref 35.1–46.3)
EGFRCR SERPLBLD CKD-EPI 2021: 109 ML/MIN/1.73M2 (ref 60–?)
EOSINOPHIL # BLD AUTO: 0.1 X10(3) UL (ref 0–0.7)
EOSINOPHIL NFR BLD AUTO: 1.8 %
ERYTHROCYTE [DISTWIDTH] IN BLOOD BY AUTOMATED COUNT: 12 % (ref 11–15)
EST. AVERAGE GLUCOSE BLD GHB EST-MCNC: 100 MG/DL (ref 68–126)
FASTING PATIENT LIPID ANSWER: YES
FASTING STATUS PATIENT QL REPORTED: YES
FSH SERPL-ACNC: 43.2 MIU/ML
GLOBULIN PLAS-MCNC: 3 G/DL (ref 2–3.5)
GLUCOSE (URINE DIPSTICK): NEGATIVE MG/DL
GLUCOSE BLD-MCNC: 88 MG/DL (ref 70–99)
HBA1C MFR BLD: 5.1 % (ref ?–5.7)
HCT VFR BLD AUTO: 38.9 %
HDLC SERPL-MCNC: 76 MG/DL (ref 40–59)
HGB BLD-MCNC: 13 G/DL
IMM GRANULOCYTES # BLD AUTO: 0.01 X10(3) UL (ref 0–1)
IMM GRANULOCYTES NFR BLD: 0.2 %
KETONES (URINE DIPSTICK): NEGATIVE MG/DL
KIT LOT #: NORMAL NUMERIC
LDLC SERPL CALC-MCNC: 121 MG/DL (ref ?–100)
LEUKOCYTES: NEGATIVE
LH SERPL-ACNC: 36.1 MIU/ML
LYMPHOCYTES # BLD AUTO: 1.55 X10(3) UL (ref 1–4)
LYMPHOCYTES NFR BLD AUTO: 28 %
MCH RBC QN AUTO: 31.3 PG (ref 26–34)
MCHC RBC AUTO-ENTMCNC: 33.4 G/DL (ref 31–37)
MCV RBC AUTO: 93.7 FL
MONOCYTES # BLD AUTO: 0.5 X10(3) UL (ref 0.1–1)
MONOCYTES NFR BLD AUTO: 9 %
MULTISTIX LOT#: NORMAL NUMERIC
NEUTROPHILS # BLD AUTO: 3.33 X10 (3) UL (ref 1.5–7.7)
NEUTROPHILS # BLD AUTO: 3.33 X10(3) UL (ref 1.5–7.7)
NEUTROPHILS NFR BLD AUTO: 60.3 %
NITRITE, URINE: NEGATIVE
NONHDLC SERPL-MCNC: 133 MG/DL (ref ?–130)
OCCULT BLOOD: NEGATIVE
OSMOLALITY SERPL CALC.SUM OF ELEC: 288 MOSM/KG (ref 275–295)
PH, URINE: 6.5 (ref 4.5–8)
PLATELET # BLD AUTO: 205 10(3)UL (ref 150–450)
POTASSIUM SERPL-SCNC: 4.2 MMOL/L (ref 3.5–5.1)
PREGNANCY TEST, URINE: NEGATIVE
PROT SERPL-MCNC: 7.4 G/DL (ref 5.7–8.2)
PROTEIN (URINE DIPSTICK): NEGATIVE MG/DL
RBC # BLD AUTO: 4.15 X10(6)UL
SODIUM SERPL-SCNC: 140 MMOL/L (ref 136–145)
SPECIFIC GRAVITY: 1.01 (ref 1–1.03)
TRIGL SERPL-MCNC: 68 MG/DL (ref 30–149)
TSI SER-ACNC: 1.3 MIU/ML (ref 0.55–4.78)
URINE-COLOR: CLEAR
UROBILINOGEN,SEMI-QN: 0.2 MG/DL (ref 0–1.9)
VIT D+METAB SERPL-MCNC: 40.1 NG/ML (ref 30–100)
VLDLC SERPL CALC-MCNC: 12 MG/DL (ref 0–30)
WBC # BLD AUTO: 5.5 X10(3) UL (ref 4–11)

## 2024-07-11 PROCEDURE — 80050 GENERAL HEALTH PANEL: CPT | Performed by: INTERNAL MEDICINE

## 2024-07-11 PROCEDURE — 83002 ASSAY OF GONADOTROPIN (LH): CPT | Performed by: INTERNAL MEDICINE

## 2024-07-11 PROCEDURE — 81514 NFCT DS BV&VAGINITIS DNA ALG: CPT | Performed by: INTERNAL MEDICINE

## 2024-07-11 PROCEDURE — 83001 ASSAY OF GONADOTROPIN (FSH): CPT | Performed by: INTERNAL MEDICINE

## 2024-07-11 PROCEDURE — 99214 OFFICE O/P EST MOD 30 MIN: CPT | Performed by: INTERNAL MEDICINE

## 2024-07-11 PROCEDURE — 81025 URINE PREGNANCY TEST: CPT | Performed by: INTERNAL MEDICINE

## 2024-07-11 PROCEDURE — 80061 LIPID PANEL: CPT | Performed by: INTERNAL MEDICINE

## 2024-07-11 PROCEDURE — 88175 CYTOPATH C/V AUTO FLUID REDO: CPT | Performed by: INTERNAL MEDICINE

## 2024-07-11 PROCEDURE — 81003 URINALYSIS AUTO W/O SCOPE: CPT | Performed by: INTERNAL MEDICINE

## 2024-07-11 PROCEDURE — 82306 VITAMIN D 25 HYDROXY: CPT | Performed by: INTERNAL MEDICINE

## 2024-07-11 PROCEDURE — 99396 PREV VISIT EST AGE 40-64: CPT | Performed by: INTERNAL MEDICINE

## 2024-07-11 PROCEDURE — 83036 HEMOGLOBIN GLYCOSYLATED A1C: CPT | Performed by: INTERNAL MEDICINE

## 2024-07-11 RX ORDER — MELATONIN
1000 DAILY
COMMUNITY
End: 2024-07-11 | Stop reason: CLARIF

## 2024-07-11 NOTE — PROGRESS NOTES
Subjective:   Judy Guy is a 43 year old female  who presents for Physical (z)       Denies family hx of breast or colon cancer.  Denies breast or nipple changes.     Colon cancer screening: was previously given referral for cscope but did not complete.    Breast cancer screening: annual mammogram (Nov 2024)  Continue annual bilateral whole breast ultrasound due to dense breasts (next Feb 2025)  Cervical cancer screening: had pap smear 1/2021 NIL and negative for HPV. Also as NIL pap in 2018  Pt is not yet due for pap smear, but she would like to complete today.    Reports occasional bloating- gasx helps. Discussed monitoring/follow-up.   Also reports feeling larger/\"ball\" left side of pelvis.   No acute masses/pain   Pt complaining of vaginal itching- resolved with OTC monistat    Last menses 12/2023  Lupe-menopause.   Uses condoms.     Reports urinary frequency at times, but no dysuria.   Chronic- may be related to certain beverages vs other     Remainder of ROS negative     History/Other:    Chief Complaint Reviewed and Verified  Nursing Notes Reviewed and   Verified  Tobacco Reviewed  Allergies Reviewed  Medications Reviewed    Medical History Reviewed  Surgical History Reviewed  OB Status Reviewed    Family History Reviewed  Social History Reviewed         Current Outpatient Medications   Medication Sig Dispense Refill    cholecalciferol 25 MCG (1000 UT) Oral Tab Take 1 tablet (1,000 Units total) by mouth daily.      Aluminum & Magnesium Hydroxide (MAGNESIUM-ALUMINUM OR) Take by mouth.         Review of Systems:  Pertinent items are noted in HPI.  A comprehensive 10 point review of systems was completed.  Pertinent positives and negatives noted in the the HPI.        Objective:   /58 (BP Location: Left arm, Patient Position: Sitting, Cuff Size: adult)   Pulse 71   Temp 97.8 °F (36.6 °C) (Temporal)   Resp 15   Ht 5' 2\" (1.575 m)   Wt 157 lb (71.2 kg)   SpO2 99%   BMI 28.72 kg/m²   Estimated body mass index is 28.72 kg/m² as calculated from the following:    Height as of this encounter: 5' 2\" (1.575 m).    Weight as of this encounter: 157 lb (71.2 kg).  PHYSICAL EXAM:   General: no acute distress   Eyes: pupils equal and reactive; EOMI; sclera normal; conjunctiva normal   ENT:without erythema or exudate  Neck: trachea midline; no adenopathy; thyroid not enlarged   Hematologic/lymphatic:no cervical lymphadenopathy; no supraclavicular adenopathy   Respiratory: no increased work of breathing; good air exchange; CTAB; no crackles or wheezing   Cardiovascular: RRR; S1, S2; no murmurs; no carotid bruits; no edema   Gastrointestinal: normal bowel sounds; soft; non-distended; non-tender  Neurological: awake, alert, oriented x3; CNII-XII grossly intact;   MSK: full ROM; strength 5/5  Behavioral/Psych: euthymic; appropriate affect   Breasts: symmetrical; no masses or nipple discharge or rashes/lesions noted   : no external vaginal lesions noted. No copious discharge; cervical os visualized.       Assessment & Plan:   Judy was seen today for physical.    Diagnoses and all orders for this visit:    Annual physical exam  -     [facility] KLAUDIA 2D+3D SCREENING BILAT (CPT=77067/00411); Future  -     US BREAST BILATERAL COMPLETE (CPT=76641-50); Future  -     Vitamin D; Future  -     CBC With Differential With Platelet; Future  -     Comp Metabolic Panel (14); Future  -     Hemoglobin A1C; Future  -     TSH W Reflex To Free T4; Future  -     Lipid Panel; Future  -     Vaginitis Vaginosis PCR Panel; Future  -pap smear    Encounter for screening mammogram for malignant neoplasm of breast  -     [facility] KLAUDIA 2D+3D SCREENING BILAT (CPT=77067/87898); Future due 11/2024    Cyst of breast, unspecified laterality  -     US BREAST BILATERAL COMPLETE (CPT=76641-50); Future due 2/2025    Colon cancer screening  -     Gastro Referral - In Network    Vaginal itching  -     Vaginitis Vaginosis PCR Panel; Future    Missed menses  likely perimenopause   -     FSH [E]; Future  -     LH (Luteinizing Hormone) [E]; Future  -     Urine Preg Test    Abdominal/pelvic symptom  -     US PELVIS (TRANSABDOMINAL AND TRANSVAGINAL) (CPT=76856/00449); Future    Abdominal bloating  -discussed monitoring diet/diet changes  -gas x, pepcid   -if no improvement then RTC sooner     Urinary frequency  -     Urine Dip, auto without Micro              Geni Sarah Lovett MD

## 2024-07-13 LAB
BV BACTERIA DNA VAG QL NAA+PROBE: POSITIVE
C GLABRATA DNA VAG QL NAA+PROBE: NEGATIVE
C KRUSEI DNA VAG QL NAA+PROBE: NEGATIVE
CANDIDA DNA VAG QL NAA+PROBE: NEGATIVE
T VAGINALIS DNA VAG QL NAA+PROBE: NEGATIVE

## 2024-07-16 LAB
.: NORMAL
.: NORMAL

## 2024-07-17 LAB — HPV E6+E7 MRNA CVX QL NAA+PROBE: NEGATIVE

## 2024-08-08 ENCOUNTER — HOSPITAL ENCOUNTER (OUTPATIENT)
Dept: ULTRASOUND IMAGING | Age: 44
Discharge: HOME | End: 2024-08-08
Attending: INTERNAL MEDICINE
Payer: COMMERCIAL

## 2024-08-08 DIAGNOSIS — R19.8 ABDOMINAL/PELVIC SYMPTOM: ICD-10-CM

## 2024-08-08 PROCEDURE — 76856 US EXAM PELVIC COMPLETE: CPT | Performed by: INTERNAL MEDICINE

## 2024-08-08 PROCEDURE — 76830 TRANSVAGINAL US NON-OB: CPT | Performed by: INTERNAL MEDICINE

## 2024-09-16 NOTE — H&P
[facility] - Gastroenterology                                                                                                               Reason for consult: eval    Requesting physician or provider: Santa Gong MD    Chief Complaint   Patient presents with    Colonoscopy Screening       HPI:   Judy Shoemaker is a 44 year old year-old female with history of breast ca, oa:    she is here today for evaluation prior to cln    she moves her bowels daily.  she denies brbpr and/or melena.    Heartburn with spicy food.  Takes gas-x w/ improvement.  Sx new x last year. she denies dysphagia, odynophagia and/or globus. she denies abdominal pain. she denies nausea and/or vomiting.  she denies recent change in appetite and/or unintentional weight loss.    NSAIDS: no  Tobacco: no  Alcohol: occasional  Marijuana: no  Illicit drugs: no    No FH GI malignancy    No history of adverse reaction to sedation  No JACQUE  No anticoagulants  No pacemaker/defibrillator  No pain medications and/or sleep aides      Last colonoscopy: no  Last EGD: no    Wt Readings from Last 6 Encounters:   24 159 lb (72.1 kg)   24 157 lb (71.2 kg)   05/15/24 154 lb (69.9 kg)   23 158 lb (71.7 kg)   23 155 lb 3.2 oz (70.4 kg)   22 153 lb 12.8 oz (69.8 kg)        History, Medications, Allergies, ROS:      Past Medical History:    Anemia    Breast cyst    Right Breast Cyst    Osteoarthritis      Past Surgical History:   Procedure Laterality Date          x2      Family Hx:   Family History   Problem Relation Age of Onset    Cancer Father         lymphoma    Diabetes Mother     Hypertension Mother       Social History:   Social History     Socioeconomic History    Marital status:    Tobacco Use    Smoking status: Never    Smokeless tobacco: Never   Vaping Use    Vaping status: Never Used   Substance and Sexual Activity    Alcohol use: Yes     Alcohol/week: 4.0 standard  drinks of alcohol     Types: 2 Glasses of wine, 2 Standard drinks or equivalent per week     Comment: 2 drinks/week occ    Drug use: No    Sexual activity: Yes     Partners: Male     Birth control/protection: Paragard   Other Topics Concern    Caffeine Concern No    Exercise Yes    Seat Belt Yes    Special Diet No    Stress Concern No    Weight Concern No        Medications (Active prior to today's visit):  Current Outpatient Medications   Medication Sig Dispense Refill    PROLENSA 0.07 % Ophthalmic Solution INSTILL 1 DROP IN RIGHT EYE TWICE DAILY AFTER PROCEDURE      ofloxacin 0.3 % Ophthalmic Solution INSTILL 1 DROP TO SURGICAL EYE FOUR TIMES DAILY      prednisoLONE 1 % Ophthalmic Suspension SHAKE LIQUID AND INSTILL 1 DROP IN RIGHT EYE FOUR TIMES DAILY AFTER PROCEDURE      Aluminum & Magnesium Hydroxide (MAGNESIUM-ALUMINUM OR) Take by mouth.      cholecalciferol (VITAMIN D3) 125 MCG (5000 UT) Oral Cap Take 1 capsule (5,000 Units total) by mouth daily.         Allergies:  No Known Allergies    ROS:   CONSTITUTIONAL: negative for fevers, chills, sweats and weight loss  EYES Negative for red eyes, yellow eyes, changes in vision  HEENT: Negative for dysphagia and hoarseness  RESPIRATORY: Negative for cough and shortness of breath  CARDIOVASCULAR: Negative for chest pain, palpitations  GASTROINTESTINAL: See HPI  GENITOURINARY: Negative for dysuria and frequency  MUSCULOSKELETAL: Negative for arthralgias and myalgias  NEUROLOGICAL: Negative for dizziness and headaches  BEHAVIOR/PSYCH: Negative for anxiety and poor appetite    PHYSICAL EXAM:   Blood pressure 111/70, pulse 66, height 5' 2\" (1.575 m), weight 159 lb (72.1 kg), not currently breastfeeding.    GEN: WD/WN, NAD  HEENT: Supple symmetrical, trachea midline  CV: RRR, the extremities are warm and well perfused   LUNGS: No increased work of breathing  ABDOMEN: No scars, normal bowel sounds, soft, non-tender, non-distended no rebound or guarding, no masses, no  hepatomegaly  MSK: No redness, no warmth, no swelling of joints  SKIN: No jaundice, no erythema, no rashes  HEMATOLOGIC: No bleeding, no bruising  NEURO: Alert and interactive, normal gait    Labs/Imaging/Procedures:     Patient's pertinent labs and imaging were reviewed and discussed with patient today.        .  ASSESSMENT/PLAN:   Judy Guy is a 44 year old year-old female with history of breast ca, oa:    #heartburn  Sx x last year w/ dietary trigger of spicy food.  No red flags. No fhx gi malignancy.  Plan as below and ctm for need for egd.    #crc screening  She is here today as a referral from her  PCP for evaluation prior to undergoing colonoscopy for CRC screening.  She denies red flags such as recent change in bm, brbpr, and/or melena.  She denies a FHx GI malignancy.  She has not had a colonoscopy and so is now due for CRC screening. We discussed the available screening options for CRC such as FIT, cologuard, and CT colonography as well as efficacy of these modalities. They are electing to pursue cln at this time.      -hpylori testing  -reflux diet modification  -pepcid as needed    1. Schedule colonoscopy with IV or MAC w/ general pool MD [Diagnosis: crc screening]    2.  bowel prep from pharmacy ([facility])    3.   For cardiology patients and patients on blood thinners:  Please contact your cardiology clinic for clearance to proceed with the endoscopic procedure. If you are on blood thinners, please also confirm with your cardiologic clinic that you are able to hold the blood thinner per our recommendations.\"    BLOOD THINNER ORDERS:  -Hold for 48 hours (Xarelto, Eliquis, Pradaxa, Savaysa)  -Hold for 3 days (Pletal)  -Hold for 5 days (Coumadin, Plavix, Brilinta, Aggrenox)  -Hold for 7 days (Effient)     For endocrinology insulin patients:    Please contact your endocrinology clinic for insulin adjustment orders prior to your endoscopic procedure.    4. Read all bowel prep instructions  carefully. Bowel prep instructions can also be found online at:  [email or web address]     5. AVOID seeds, nuts, popcorn, raw fruits and vegetables for 3 days before procedure    6. You MAY need to go for COVID testing 72 hours before procedure. The testing team will call you a few days before your procedure to discuss with you if testing is required. If you are asked to go for COVID testing and do not completed the test, the procedure cannot be performed.     7. If you start any NEW medication after your visit today, please notify us. Certain medications (like iron or weight loss medications) will need to be held before the procedure, or the procedure cannot be performed safely.      Orders This Visit:  No orders of the defined types were placed in this encounter.      Meds This Visit:  Requested Prescriptions      No prescriptions requested or ordered in this encounter       Imaging & Referrals:  None    ENDOSCOPIC RISK BENEFIT DISCUSSION: I described the procedure in great detail with the patient. I discussed the risks and benefits, including but not limited to: bleeding, perforation, infection, anesthesia complications, and even death. Patient will be NPO after midnight and will have a person physically present at time of pick-up to drive patient home. Patient verbalized understanding and agrees to proceed with procedure as planned.    Kaila Dolan, APRN   9/19/2024        This note was partially prepared using Dragon Medical voice recognition dictation software. As a result, errors may occur. When identified, these errors have been corrected. While every attempt is made to correct errors during dictation, discrepancies may still exist.

## 2024-09-19 NOTE — TELEPHONE ENCOUNTER
Scheduled for: Colonoscopy 78387   Provider Name: Dr Apple   Date: Thurs 10/10/2024   Location:  [facility]  Sedation: IV   Time: 1:15 pm, (pt is aware that Endo will call the day before to confirm arrival time)  Prep: split trilyte   Meds/Allergies Reconciled?:  NKDA   Diagnosis with codes: coon screening Z12.11   Was patient informed to call insurance with codes (Y/N): Yes  Referral sent?: Yes, commercial   EM or [facility] notified?:  I sent an electronic request to Endo Scheduling and received a confirmation today.       Medication Orders: Patient is aware to NOT take iron pills, herbal meds and diet supplements for 7 days before exam. Also to NOT take any form of alcohol, recreational drugs and any forms of ED meds 24-72 hours before exam.      Misc Orders:       Further instructions given by staff:  Instructions given in office and pt verbalized understanding      Instructions     1. Schedule colonoscopy with IV or MAC w/ general pool MD [Diagnosis: crc screening]     2.  bowel prep from pharmacy (split trilyte)     3.   For cardiology patients and patients on blood thinners:  Please contact your cardiology clinic for clearance to proceed with the endoscopic procedure. If you are on blood thinners, please also confirm with your cardiologic clinic that you are able to hold the blood thinner per our recommendations.\"     BLOOD THINNER ORDERS:  -Hold for 48 hours (Xarelto, Eliquis, Pradaxa, Savaysa)  -Hold for 3 days (Pletal)  -Hold for 5 days (Coumadin, Plavix, Brilinta, Aggrenox)  -Hold for 7 days (Effient)      For endocrinology insulin patients:     Please contact your endocrinology clinic for insulin adjustment orders prior to your endoscopic procedure.     4. Read all bowel prep instructions carefully. Bowel prep instructions can also be found online at:  [email or web address]      5. AVOID seeds, nuts, popcorn, raw fruits and vegetables for 3 days before procedure     6. You MAY need to go for COVID  testing 72 hours before procedure. The testing team will call you a few days before your procedure to discuss with you if testing is required. If you are asked to go for COVID testing and do not completed the test, the procedure cannot be performed.      7. If you start any NEW medication after your visit today, please notify us. Certain medications (like iron or weight loss medications) will need to be held before the procedure, or the procedure cannot be performed safely.

## 2024-09-19 NOTE — PATIENT INSTRUCTIONS
-hpylori testing  -reflux diet modification  -pepcid as needed    1. Schedule colonoscopy with IV or MAC adamaris/ general pool MD [Diagnosis: crc screening]    2.  bowel prep from pharmacy ([facility])    3.   For cardiology patients and patients on blood thinners:  Please contact your cardiology clinic for clearance to proceed with the endoscopic procedure. If you are on blood thinners, please also confirm with your cardiologic clinic that you are able to hold the blood thinner per our recommendations.\"    BLOOD THINNER ORDERS:  -Hold for 48 hours (Xarelto, Eliquis, Pradaxa, Savaysa)  -Hold for 3 days (Pletal)  -Hold for 5 days (Coumadin, Plavix, Brilinta, Aggrenox)  -Hold for 7 days (Effient)     For endocrinology insulin patients:    Please contact your endocrinology clinic for insulin adjustment orders prior to your endoscopic procedure.    4. Read all bowel prep instructions carefully. Bowel prep instructions can also be found online at:  [email or web address]     5. AVOID seeds, nuts, popcorn, raw fruits and vegetables for 3 days before procedure    6. You MAY need to go for COVID testing 72 hours before procedure. The testing team will call you a few days before your procedure to discuss with you if testing is required. If you are asked to go for COVID testing and do not completed the test, the procedure cannot be performed.     7. If you start any NEW medication after your visit today, please notify us. Certain medications (like iron or weight loss medications) will need to be held before the procedure, or the procedure cannot be performed safely.      Tips to Control Acid Reflux    To control acid reflux, you’ll need to make some basic diet and lifestyle changes. The simple steps outlined below may be all you’ll need to ease discomfort.   Watch what you eat  Don't have fatty foods or spicy foods.  Eat fewer acidic foods, such as citrus and tomato-based foods. These can increase symptoms.  Limit  drinking alcohol, caffeine, and fizzy beverages. All increase acid reflux.  Try limiting chocolate, peppermint, and spearmint. These can make acid reflux worse in some people.     Watch when you eat  Don't lie down for 3 hours after eating.  Don't snack before going to bed.     Raise your head  Raising your head and upper body by 4 to 6 inches helps limit reflux when you’re lying down. Put blocks under the head of your bed frame or a wedge under your mattress to raise it.   Other changes  Lose weight, if you need to  Don’t exercise near bedtime  Don't wear tight-fitting clothes  Limit aspirin and ibuprofen  Stop smoking     [employer] last reviewed this educational content on 6/1/2019 © 2000-2020 [employer]. [address] [place] [address], [place] [address]. All rights reserved. This information is not intended as a substitute for professional medical care. Always follow your healthcare professional's instructions.

## 2024-09-24 NOTE — TELEPHONE ENCOUNTER
Hp pos.    H.pylori gastric infection identified on a test. The natural history of H.pylori was reviewed with the patient, as well as possible complications from H.pylori including stomach cancer, gastritis, dyspepsia, anemia and ulcers. We discussed treatment options and rationale for treatment, as well as testing for eradication. The patient understands the risks/benefits/side-effects of treatment and wants to proceed. The patient understands only 80-95% of patients have eradication to initial course of abx, and therefore may need another course of abx if fails first treatment. There may be side-effects during treatment and patient should call if any problems.      Plan:  Will start w/quad therapy x 14 days.   I have instructed patient to check repeat H.pylori test 4 weeks after completion of her antibiotics to ensure eradication (order placed).

## 2024-11-07 NOTE — H&P
History & Physical Examination    Patient Name: Judy Astorga  MRN: [id number]  [facility]: [id number]  YOB: 1980    Diagnosis: screening for colon cancer    Prescriptions Prior to Admission[1]  No current facility-administered medications for this encounter.       Allergies: Allergies[2]    Past Medical History:    Anemia    Breast cyst    Right Breast Cyst    Osteoarthritis     Past Surgical History:   Procedure Laterality Date          x2     Family History   Problem Relation Age of Onset    Cancer Father         lymphoma    Diabetes Mother     Hypertension Mother      Social History     Tobacco Use    Smoking status: Never    Smokeless tobacco: Never   Substance Use Topics    Alcohol use: Yes     Alcohol/week: 4.0 standard drinks of alcohol     Types: 2 Glasses of wine, 2 Standard drinks or equivalent per week     Comment: 2 drinks/week occ       SYSTEM Check if Review is Normal Check if Physical Exam is Normal If not normal, please explain:   HEENT [X ] [ X]    NECK  [X ] [ X]    HEART [X ] [ X]    LUNGS [X ] [ X]    ABDOMEN [X ] [ X]    EXTREMITIES [X ] [ X]    OTHER        I have discussed the risks and benefits and alternatives of the procedure with the patient/family.  They understand and agree to proceed with plan of care.   I have reviewed the History and Physical done within the last 30 days.  Any changes noted above.    Анна Apple MD  [facility] Gastroenterology                   [1]   Medications Prior to Admission   Medication Sig Dispense Refill Last Dose/Taking    PROLENSA 0.07 % Ophthalmic Solution INSTILL 1 DROP IN RIGHT EYE TWICE DAILY AFTER PROCEDURE   Taking    ofloxacin 0.3 % Ophthalmic Solution INSTILL 1 DROP TO SURGICAL EYE FOUR TIMES DAILY   Taking    prednisoLONE 1 % Ophthalmic Suspension SHAKE LIQUID AND INSTILL 1 DROP IN RIGHT EYE FOUR TIMES DAILY AFTER PROCEDURE   Taking    Aluminum & Magnesium Hydroxide (MAGNESIUM-ALUMINUM OR) Take by mouth.    2024    cholecalciferol (VITAMIN D3) 125 MCG (5000 UT) Oral Cap Take 1 capsule (5,000 Units total) by mouth daily.   2024    [] bismuth subsalicylate 262 MG Oral Chew Tab Chew 2 tablets (524 mg total) by mouth 4 (four) times daily for 14 days. 112 tablet 0     [] metRONIDAZOLE 250 MG Oral Tab Take 1 tablet (250 mg total) by mouth 4 (four) times daily for 14 days. 56 tablet 0     [] Tetracycline HCl 500 MG Oral Cap Take 1 capsule (500 mg total) by mouth 4 (four) times daily for 14 days. 56 capsule 0     [] pantoprazole 40 MG Oral Tab EC Take 1 tablet (40 mg total) by mouth 2 (two) times daily before meals for 14 days. 28 tablet 0     PEG 3350-KCl-Na Bicarb-NaCl (TRILYTE) 420 g Oral Recon Soln Take prep as directed by gastro office. May substitute with Trilyte/generic equivalent if needed. 4000 mL 0    [2] No Known Allergies

## 2024-11-07 NOTE — OPERATIVE REPORT
COLONOSCOPY REPORT    Judy Astorga     1980 Age 44 year old   PCP Geni Lovett MD Endoscopist Анна Apple MD       Date of procedure: 24    Procedure: Colonoscopy w/ cold snare polypectomy     Pre-operative diagnosis: CRC screening     Post-operative diagnosis: colon polyp, hemorrhoids     Medications: 7 mg versed, 100 mcg fentanyl     Total sedation time: 28 minutes    Withdrawal time: 17 minutes    Procedure:  Informed consent was obtained from the patient after the risks of the procedure were discussed, including but not limited to bleeding, perforation, aspiration, infection, or possibility of a missed lesion. After discussions of the risks/benefits and alternatives to this procedure, as well as the planned sedation, the patient was placed in the left lateral decubitus position and begun on continuous blood pressure pulse oximetry and EKG monitoring and this was maintained throughout the procedure. Once an adequate level of sedation was obtained a digital rectal exam was completed. Then the lubricated tip of the [id number] diagnostic video colonoscope was inserted and advanced without difficulty to the cecum using water immersion and CO2 insufflation technique. The cecum was identified by localizing the trifold, the appendix and the ileocecal valve. Withdrawal was begun with thorough washing and careful examination of the colonic walls and folds. A routine second examination of the cecum/ascending colon was performed. Photodocumentation was obtained. The bowel prep was excellent. Views of the colon were excellent with washing. I then carefully withdrew the instrument from the patient who tolerated the procedure well.     Complications: none.    Findings:   1. One polyp noted as follows:      A. 5 mm polyp in the ascending colon; sessile morphology; cold snare polypectomy performed, polyp retrieved.    2. Diverticulosis: none.    3. Ileocecal valve appeared healthy  and normal. The examined portion of the terminal ileum appeared normal.    4. The colonic mucosa throughout the colon showed normal vascular pattern, without evidence of angioectasias or inflammation.     5. A retroflexed view of the rectum revealed small internal hemorrhoids.    6. CAMACHO: normal rectal tone, no masses palpated.     Impression:   One sub-centimeter polyp removed.   Small internal hemorrhoids.   The colon was otherwise normal with glistening mucosa and intact vascular pattern.    Recommend:  Await pathology. The interval for the next colonoscopy will be determined after reviewing pathology. If new signs or symptoms develop, colonoscopy may need to be repeated sooner.   High fiber diet.  Monitor for blood in the stool. If having more than just tinge of blood, call office or go to the ER.  Avoid NSAIDs (motrin, ibuprofen, aleve, advil, naproxen, midol, naprosyn, excedrin) for 14 days.     >>>If tissue was obtained and you have not received your pathology results either by phone or letter within 2 weeks, please call our office at [phone].    Specimens: colon polyp     Blood loss: <1 ml

## 2024-11-07 NOTE — DISCHARGE INSTRUCTIONS
Home Care Instructions for Colonoscopy with Sedation    Diet:  - Resume your regular diet as tolerated unless otherwise instructed.  - Start with light meals to minimize bloating.  - Do not drink alcohol today.    Medication:  - If you have questions about resuming your normal medications, please contact your Primary Care Physician.    Activities:  - Take it easy today. Do not return to work today.  - Do not drive today.  - Do not operate any machinery today (including kitchen equipment).    Colonoscopy:  - You may notice some rectal \"spotting\" (a little blood on the toilet tissue) for a day or two after the exam. This is normal.  - If you experience any rectal bleeding (not spotting), persistent tenderness or sharp severe abdominal pains, oral temperature over 100 degrees Fahrenheit, light-headedness or dizziness, or any other problems, contact your doctor.      **If unable to reach your doctor, please go to the [facility] Emergency Room**    - Your referring physician will receive a full report of your examination.  - If you do not hear from your doctor's office within two weeks of your biopsy, please call them for your results.    You may be able to see your laboratory results in [employer] between 4 and 7 business days.  In some cases, your physician may not have viewed the results before they are released to [employer].  If you have questions regarding your results contact the physician who ordered the test/exam by phone or via [employer] by choosing \"Ask a Medical Question.\"

## 2024-11-08 NOTE — PROGRESS NOTES
GI Staff:    Can you please place recall for this patient to have a colonoscopy in 5 years.    Thank you,  Анна

## 2024-11-11 NOTE — TELEPHONE ENCOUNTER
----- Message from Анна Apple sent at 11/8/2024 12:25 PM CST -----  GI Staff:    Can you please place recall for this patient to have a colonoscopy in 5 years.    Thank you,  Анна

## 2024-11-11 NOTE — TELEPHONE ENCOUNTER
Recall colonoscopy in 5 years per Dr. Apple     Colon done 11/7/2024    Health maintenance updated and message sent to patient outreach to repeat colonoscopy in 5 years    Results seen by patient via MyChart  Seen by patient Judy Astorga on 11/8/2024 12:28 PM

## 2024-12-27 ENCOUNTER — APPOINTMENT (OUTPATIENT)
Dept: URGENT CARE | Age: 44
End: 2024-12-27

## 2025-03-13 NOTE — PROGRESS NOTES
Subjective:   Judy Astorga is a 44 year old female who presents for Hemorrhoids   The patient experiences pain while passing stools.  Occasional blood stained stools.  She has tried hemorrhoidal creams including phenylephrine and lidocaine    She feels something coming out of the rectum.      The stool consistency is normal.  The patient had a colonoscopy in 2024 and it was normal.    History/Other:    Chief Complaint Reviewed and Verified  No Further Nursing Notes to   Review  Tobacco Reviewed  Allergies Reviewed  Medications Reviewed    Medical History Reviewed  Surgical History Reviewed  Family History   Reviewed  Social History Reviewed         Tobacco:  She has never smoked tobacco.    Current Outpatient Medications   Medication Sig Dispense Refill    PROLENSA 0.07 % Ophthalmic Solution INSTILL 1 DROP IN RIGHT EYE TWICE DAILY AFTER PROCEDURE      ofloxacin 0.3 % Ophthalmic Solution INSTILL 1 DROP TO SURGICAL EYE FOUR TIMES DAILY      prednisoLONE 1 % Ophthalmic Suspension SHAKE LIQUID AND INSTILL 1 DROP IN RIGHT EYE FOUR TIMES DAILY AFTER PROCEDURE      Aluminum & Magnesium Hydroxide (MAGNESIUM-ALUMINUM OR) Take by mouth.      cholecalciferol (VITAMIN D3) 125 MCG (5000 UT) Oral Cap Take 1 capsule (5,000 Units total) by mouth daily.           Review of Systems:  Pertinent items are noted in HPI.  A comprehensive review of systems was negative.      Objective:   /70 (BP Location: Left arm, Patient Position: Sitting, Cuff Size: adult)   Pulse 87   Temp 97.9 °F (36.6 °C) (Temporal)   Ht 5' 2\" (1.575 m)   Wt 166 lb (75.3 kg)   LMP 08/01/2024 (Approximate)   SpO2 99%   BMI 30.36 kg/m²  Estimated body mass index is 30.36 kg/m² as calculated from the following:    Height as of this encounter: 5' 2\" (1.575 m).    Weight as of this encounter: 166 lb (75.3 kg).  General condition: Not in distress.    HEENT: Atraumatic normocephalic  No cervical or submandibular lymphadenopathy  Chest: Clear  to auscultate  Heart: S1-S2 ,no murmur  Abdomen: Soft non tender, no palpable organomegaly  Neurological examination: No facial asymmetry.  No lateralizing neurological signs.  Mental state examination: Good eye to eye contact.  Normal mood and behavior.  Engaged in meaningful conversations.  Extremities:  Normal upper extremities  Legs: No edema  Rectal examination was in the presence of chaperone.  Skin tag at the 9:00 area.  Increased rectal tone.    Assessment & Plan:     1.  Anal pain and concern for hemorrhoids:.  The patient has significantly increased rectal tone.  Sitz bath.  Apply 3% x 2 glycerin for 4 times daily prn.  Fibrous diet.  Plenty water.    2. Class I obesity: TSH is normal    Weight Loss Advice :  A) Eat plant based diet and avoid ultra processed and fast foods.  B) Your portions should be a dinner plate. 1/2 should be vegetables, 1/4 lean protein (chicken, fish, turkey. Tofu), and 1/4 can be carbohydrates.   C)  Your main drink should be water rather than soda or alcohol or sweet coffees  D). Please try to exercise ( brisk walking or jogging) at least 30 minutes five times/week; and do muscle strengthening exercises ( lifting the weight, doing push ups or yoga)  twice a week    E). It is very important to get 7-9 hours of sleep per night        No follow-ups on file.    Terrence Downey MD, 3/13/2025, 1:10 PM

## 2025-03-14 NOTE — TELEPHONE ENCOUNTER
Pt calling about RX sent by KEIRY yesterday. States that the [facility] in [place] never received the script. Asking if we can please resend the medication    Disp Refills Start End    nitroglycerin 0.3% ointment 1 each 0 3/13/2025 --    Sig - Route: Place 1 Squirt rectally 4 (four) times daily as needed. apply with finger turnicot 4 times a day after sitz baths - Rectal

## 2025-03-14 NOTE — TELEPHONE ENCOUNTER
Spoke with [facility] pharmacy they stated they did not receive the prescription     I have pended new prescription , please sign TJ

## 2025-03-17 NOTE — TELEPHONE ENCOUNTER
Medication nitroglycerin 0.3% ointment has been called in By  to jerome listed in patients chart.    Outgoing call to patient no answer , called to inform patient that her medication has been successfully put through her pharmacy and should be ok to

## 2025-03-17 NOTE — TELEPHONE ENCOUNTER
Pt would like for office to call this medication in as pharmacy is saying they have not received a Rx. Tis is the 2nd x she calls to get it completed     nitroglycerin 0.3% ointment     [id number] - [place], [place] - [address] AT [facility] [address], [phone], [phone]

## 2025-06-30 NOTE — DISCHARGE INSTRUCTIONS
Make sure that you are drinking plenty of fluids.  Limit alcohol and caffeine consumption.  Stay out of the heat and humidity.    Discontinue rectal nitroglycerin at this time as it may be contributing to your symptoms.    Go to the emergency department for any new or worsening symptoms.

## 2025-06-30 NOTE — ED PROVIDER NOTES
Patient Seen in: [facility]        History  Chief Complaint   Patient presents with    Syncope     Stated Complaint: Faint/Weak    Subjective:   HPI            44-year-old female presents to the emergency department from her job where she works as a  for evaluation of lightheadedness and weakness without loss of consciousness.  Patient stated that she felt like she wanted to pass out.  No palpitations, chest pain or difficulty breathing.  She has had 2 other episodes similar to this that were associated with workouts and occurred after working out and while she was stretching.  She did not lose consciousness on either occasion.  She also states that she has been using rectal nitroglycerin for rectal pain.  She did apply some this morning before going to work but has been using it on a regular basis.  Has been drinking food and fluids well.  Last alcoholic beverage was 36 hours ago.  No vomiting or diarrhea.  No abdominal pain.  She complains of lightheadedness with these episodes but no vertigo.  No double vision or blurred vision, unilateral weakness.      Objective:     Past Medical History:    Anemia    Breast cyst    Right Breast Cyst    Osteoarthritis              Past Surgical History:   Procedure Laterality Date    Colonoscopy N/A 2024    Procedure: COLONOSCOPY;  Surgeon: Анна Apple MD;  Location: [facility] ENDOSCOPY          x2                Social History     Socioeconomic History    Marital status:    Tobacco Use    Smoking status: Never    Smokeless tobacco: Never   Vaping Use    Vaping status: Never Used   Substance and Sexual Activity    Alcohol use: Yes     Alcohol/week: 4.0 standard drinks of alcohol     Types: 2 Glasses of wine, 2 Standard drinks or equivalent per week     Comment: 2 drinks/week occ    Drug use: No    Sexual activity: Yes     Partners: Male     Birth control/protection: Paragard   Other Topics Concern    Caffeine Concern No     Exercise Yes    Seat Belt Yes    Special Diet No    Stress Concern No    Weight Concern No              Review of Systems    Positive for stated complaint: Faint/Weak  Other systems are as noted in HPI.  Constitutional and vital signs reviewed.      All other systems reviewed and negative except as noted above.                  Physical Exam    ED Triage Vitals [06/30/25 1156]   /82   Pulse 65   Resp 16   Temp 98.4 °F (36.9 °C)   Temp src Oral   SpO2 100 %   O2 Device None (Room air)       Current Vitals:   Vital Signs  BP: 130/75  Pulse: 64  Resp: 16  Temp: 98.4 °F (36.9 °C)  Temp src: Oral    Oxygen Therapy  SpO2: 100 %  O2 Device: None (Room air)            Physical Exam     General Appearance: This is a middle-aged female sitting on a gurney.  Vital signs were reviewed per nurses notes.  Monitor reveals a sinus rhythm rate 60s.  Patient is afebrile.  Pulse oximetry is 100% on room air.  Initial blood pressure was 141/82.  HEENT: Normocephalic/atraumatic.  Pupils equal round reactive to light.  Extraocular movements are intact.  Facies are symmetrical.  Oral mucosa is moist.  Neck: No adenopathy or thyromegaly.  Lungs are clear to auscultation.  Heart exam: Normal S1-S2 without extra sounds or murmurs.  Regular rate and rhythm.  Abdomen is nontender.  Extremities are atraumatic.  Skin is dry without rashes or lesions.  Neuroexam: Alert and oriented x 4.  Speech is fluent.  Moving all 4 extremities well.  Gait is steady.      ED Course  Labs Reviewed   POCT ISTAT CHEM8 CARTRIDGE - Abnormal; Notable for the following components:       Result Value    ISTAT Glucose 102 (*)     All other components within normal limits   [facility] POCT URINALYSIS DIPSTICK - Abnormal; Notable for the following components:    Blood, Urine Trace-Intact (*)     Leukocyte esterase urine Small (*)     All other components within normal limits   POCT PREGNANCY URINE - Normal   POCT CBC   URINE CULTURE, ROUTINE     EKG    Rate, intervals and  axes as noted on EKG Report.  Rate: 60  Rhythm: Sinus Rhythm  Reading: Normal EKG.  Agree with EKG report.              Intravenous access was obtained.  Laboratory studies were drawn.  IV fluids and antiemetics were administered.    Orthostatic vital signs revealed no evidence of orthostasis.    Hemoglobin and renal function are stable.  No evidence of urinary tract infection or pregnancy.    Upon reevaluation the patient was symptomatically unchanged.  Heart rate and blood pressure remained within normal range.  Treatment plan was discussed.                    MDM     #1.  Vasovagal near syncope.  Rectal nitroglycerin that the patient is using may be contributing to this.  All of the episodes that have occurred have occurred after nitroglycerin was prescribed.  I recommended she hold this medication at this time, stay adequately hydrated and out of the heat and humidity.  Directed to the emergency department for any new or worsening symptoms.        Medical Decision Making      Disposition and Plan     Clinical Impression:  1. Syncope, near         Disposition:  Discharge  6/30/2025  1:19 pm    Follow-up:  Geni Dacosta MD  [facility] [address] [address]  [phone]    Call in 1 day            Medications Prescribed:  Current Discharge Medication List                Supplementary Documentation:

## 2025-06-30 NOTE — ED INITIAL ASSESSMENT (HPI)
Pt stood after working out feeling like she was going to faint with lightheadedness, this happened twice in the past month 1 time actual syncope

## 2025-07-17 NOTE — PROGRESS NOTES
Subjective:   uJdy Astorga is a 44 year old female  who presents for Urgent Care F/u         The following individual(s) verbally consented to be recorded using ambient AI listening technology and understand that they can each withdraw their consent to this listening technology at any point by asking the clinician to turn off or pause the recording:    Patient name: Judy Astorga    EKG wnl 6/30/25      History of Present Illness      Rectal pain occurs primarily during bowel movements, characterized by tightness and straining. She initially used a prescribed cream which alleviated the pain, but discontinuation led to its return. Currently, she uses the cream every other day, which, along with increased water intake, has improved her symptoms.    Dizziness is associated with the use of the rectal cream, particularly after workouts and nightly application. Reducing the frequency of cream use has decreased dizziness. She denies chest pain, palpitations, or agitation.    A colonoscopy in November 2024 was normal, with a recommendation to return in five years. She has elevated cholesterol and weight gain, attributed to menopause.  To schedule general physical.       History/Other:    Chief Complaint Reviewed and Verified  No Further Nursing Notes to   Review  Tobacco Reviewed  Allergies Reviewed  Medications Reviewed  OB   Status Reviewed         Current Medications[1]    Review of Systems:  Pertinent items are noted in HPI.  A comprehensive 10 point review of systems was completed.  Pertinent positives and negatives noted in the the HPI.        Objective:   /64 (BP Location: Left arm, Patient Position: Sitting, Cuff Size: adult)   Pulse 60   Temp 97.6 °F (36.4 °C) (Temporal)   Wt 166 lb 3.2 oz (75.4 kg)   LMP 03/12/2025 (Approximate)   SpO2 99%   BMI 30.40 kg/m²  Estimated body mass index is 30.4 kg/m² as calculated from the following:    Height as of 3/13/25: 5' 2\" (1.575 m).     Weight as of this encounter: 166 lb 3.2 oz (75.4 kg).  PHYSICAL EXAM:   General: no acute distress   Respiratory: no increased work of breathing; good air exchange; CTAB; no crackles or wheezing   Cardiovascular: RRR; S1, S2; no murmurs  Neurological: awake, alert, oriented x3; CNII-XII grossly intact;  Behavioral/Psych: euthymic; appropriate affect   RECTAL: Skin tag present, no external hemorrhoids, internal hemorrhoid feels to be present.    Assessment & Plan:     Assessment & Plan  Rectal pain  Rectal pain likely due to internal hemorrhoids vs increased rectal tone vs other. Symptoms improved with nitroglycerin cream but caused dizziness due to hypotensive effects. Increased water intake and fiber alleviated symptoms.  - Discontinue nitroglycerin cream.  - Prescribe hydrocortisone cream for internal hemorrhoids, apply 2-3 times daily as needed for up to 14 days.  - Refer to pelvic floor therapy to relax anal muscles.  - Refer to rectal surgeon for further evaluation.    HLD- to check labs     General Health Maintenance  Concerns about elevated cholesterol and weight gain.  - Order blood tests including cholesterol levels.  - Schedule a physical examination.    Follow-up  Follow-up plans discussed for ongoing management of symptoms and health maintenance.  - Schedule follow-up appointment for physical examination in August.  - Complete blood tests prior to the physical examination.      This note was created utilizing [employer] speech recognition software which may lead to grammatical errors/typos.   If any word or phrase is confusing, it is likely due to recognition error. Please ask the provider for clarification.      Geni Lovett MD       [1]   Current Outpatient Medications   Medication Sig Dispense Refill    hydrocortisone 2.5 % External Ointment Apply 1 Application topically 2 (two) times daily as needed. 35 g 0    Aluminum & Magnesium Hydroxide (MAGNESIUM-ALUMINUM OR) Take by mouth.      cholecalciferol  (VITAMIN D3) 125 MCG (5000 UT) Oral Cap Take 1 capsule (5,000 Units total) by mouth daily.

## 2025-08-28 ENCOUNTER — LAB ENCOUNTER (OUTPATIENT)
Dept: LAB | Age: 45
End: 2025-08-28
Attending: INTERNAL MEDICINE

## 2025-08-28 DIAGNOSIS — E78.5 HYPERLIPIDEMIA, UNSPECIFIED HYPERLIPIDEMIA TYPE: ICD-10-CM

## 2025-08-28 DIAGNOSIS — Z01.89 ROUTINE LAB DRAW: ICD-10-CM

## 2025-08-28 LAB
ALBUMIN SERPL-MCNC: 4.6 G/DL (ref 3.2–4.8)
ALBUMIN/GLOB SERPL: 1.5 (ref 1–2)
ALP LIVER SERPL-CCNC: 68 U/L (ref 39–100)
ALT SERPL-CCNC: 22 U/L (ref 10–49)
ANION GAP SERPL CALC-SCNC: 13 MMOL/L (ref 0–18)
AST SERPL-CCNC: 32 U/L (ref ?–34)
BASOPHILS # BLD AUTO: 0.05 X10(3) UL (ref 0–0.2)
BASOPHILS NFR BLD AUTO: 0.8 %
BILIRUB SERPL-MCNC: 0.7 MG/DL (ref 0.3–1.2)
BUN BLD-MCNC: 12 MG/DL (ref 9–23)
CALCIUM BLD-MCNC: 10.1 MG/DL (ref 8.7–10.6)
CHLORIDE SERPL-SCNC: 102 MMOL/L (ref 98–112)
CHOLEST SERPL-MCNC: 208 MG/DL (ref ?–200)
CO2 SERPL-SCNC: 26 MMOL/L (ref 21–32)
CREAT BLD-MCNC: 0.78 MG/DL (ref 0.55–1.02)
EGFRCR SERPLBLD CKD-EPI 2021: 95 ML/MIN/1.73M2 (ref 60–?)
EOSINOPHIL # BLD AUTO: 0.15 X10(3) UL (ref 0–0.7)
EOSINOPHIL NFR BLD AUTO: 2.5 %
ERYTHROCYTE [DISTWIDTH] IN BLOOD BY AUTOMATED COUNT: 11.7 %
EST. AVERAGE GLUCOSE BLD GHB EST-MCNC: 114 MG/DL (ref 68–126)
FASTING PATIENT LIPID ANSWER: YES
FASTING STATUS PATIENT QL REPORTED: YES
GLOBULIN PLAS-MCNC: 3.1 G/DL (ref 2–3.5)
GLUCOSE BLD-MCNC: 77 MG/DL (ref 70–99)
HBA1C MFR BLD: 5.6 % (ref ?–5.7)
HCT VFR BLD AUTO: 39.1 % (ref 35–48)
HDLC SERPL-MCNC: 74 MG/DL (ref 40–59)
HGB BLD-MCNC: 13.4 G/DL (ref 12–16)
IMM GRANULOCYTES # BLD AUTO: 0.02 X10(3) UL (ref 0–1)
IMM GRANULOCYTES NFR BLD: 0.3 %
LDLC SERPL CALC-MCNC: 122 MG/DL (ref ?–100)
LYMPHOCYTES # BLD AUTO: 2.14 X10(3) UL (ref 1–4)
LYMPHOCYTES NFR BLD AUTO: 35.4 %
MCH RBC QN AUTO: 31.2 PG (ref 26–34)
MCHC RBC AUTO-ENTMCNC: 34.3 G/DL (ref 31–37)
MCV RBC AUTO: 91.1 FL (ref 80–100)
MONOCYTES # BLD AUTO: 0.57 X10(3) UL (ref 0.1–1)
MONOCYTES NFR BLD AUTO: 9.4 %
NEUTROPHILS # BLD AUTO: 3.11 X10 (3) UL (ref 1.5–7.7)
NEUTROPHILS # BLD AUTO: 3.11 X10(3) UL (ref 1.5–7.7)
NEUTROPHILS NFR BLD AUTO: 51.6 %
NONHDLC SERPL-MCNC: 134 MG/DL (ref ?–130)
OSMOLALITY SERPL CALC.SUM OF ELEC: 291 MOSM/KG (ref 275–295)
PLATELET # BLD AUTO: 214 10(3)UL (ref 150–450)
POTASSIUM SERPL-SCNC: 4.6 MMOL/L (ref 3.5–5.1)
PROT SERPL-MCNC: 7.7 G/DL (ref 5.7–8.2)
RBC # BLD AUTO: 4.29 X10(6)UL (ref 3.8–5.3)
SODIUM SERPL-SCNC: 141 MMOL/L (ref 136–145)
TRIGL SERPL-MCNC: 68 MG/DL (ref 30–149)
TSI SER-ACNC: 2.42 UIU/ML (ref 0.55–4.78)
VIT D+METAB SERPL-MCNC: 45.1 NG/ML (ref 30–100)
VLDLC SERPL CALC-MCNC: 12 MG/DL (ref 0–30)
WBC # BLD AUTO: 6 X10(3) UL (ref 4–11)

## 2025-08-28 PROCEDURE — 84443 ASSAY THYROID STIM HORMONE: CPT

## 2025-08-28 PROCEDURE — 83036 HEMOGLOBIN GLYCOSYLATED A1C: CPT

## 2025-08-28 PROCEDURE — 80053 COMPREHEN METABOLIC PANEL: CPT

## 2025-08-28 PROCEDURE — 82306 VITAMIN D 25 HYDROXY: CPT

## 2025-08-28 PROCEDURE — 80061 LIPID PANEL: CPT

## 2025-08-28 PROCEDURE — 36415 COLL VENOUS BLD VENIPUNCTURE: CPT

## 2025-08-28 PROCEDURE — 85025 COMPLETE CBC W/AUTO DIFF WBC: CPT

## (undated) NOTE — Clinical Note
Thank you for this consultation. Nothing to do for the cyst at this time. I will let you continue to order and follow her mammogram and whole breast ultrasound. If any concerns at all, please reach out! Thanks, Steven Jordan

## (undated) NOTE — LETTER
08/12/20        Mey Martinez  [address] [facility]      Dear Liban tong,    [address] [facility] records indicate that you have outstanding lab work and or testing that was ordered for you and has not yet been completed:      [address] (CPT=7

## (undated) NOTE — ED AVS SNAPSHOT
Lindsay Young   MRN: [id number]    Department:  THE [facility] Department in [place]   Date of Visit:  7/7/2019           Disclosure     Insurance plans vary and the physician(s) referred by the ER may not be covered by your plan.  Please contact tell this physician (or your personal doctor if your instructions are to return to your personal doctor) about any new or lasting problems. The primary care or specialist physician will see patients referred from the [facility] Emergency Department.  [employer]

## (undated) NOTE — MR AVS SNAPSHOT
[place]  [facility]  [address] [facility] [id number] [phone]               Thank you for choosing us for your health care visit with Janki Lima MD.  We are glad to serve you and happy to provide you with this s - [address] [facility] [place] [address] [place] [address], [phone], [address] [place] [phone]    Hours:  24-hours Phone:  [phone]    - triamcinolone acetonide 0.1 % Crea            MyChart     Visit MyChart  You can access your MyChar increments are effective and add up over the week   2 ½ hours per week – spread out over time Use a richar to keep you motivated   Don’t forget strength training with weights and resistance Set goals and track your progress   You don’t need to join a gym.

## (undated) NOTE — LETTER
Patient Name: Judy Shoemaker : 1980  Medical Record #: [id number] Printed: 2024  Page 1 of 2                                          [facility]  [address], [place], [place]  Autorización para operación y procedimiento quirúrgico                                                                                                      Por la presente, autorizo a Анна Apple MD, mi médico y al asistente a realizar la operación/procedimiento quirúrgico a continuación, así lyn a administrar la anestesia que determine necesaria mi médico Nombre (s) de la operación/procedimiento: COLONOSCOPY en Judy Shoemaker     Reconozco que kendell la operación/procedimiento quirúrgico, las condiciones imprevistas pueden requerir de procedimientos adicionales o diferentes a aquellos mencionados anteriormente.  Por lo tanto, autorizo y solicito además que el cirujano antes mencionado, los asistentes o las personas designadas realicen los procedimientos que, a main juicio, radha necesarios y convenientes.    Mi cirujano/médico beltran discutido antes de mi cirugía los posibles beneficios, riesgos y efectos secundarios de medardo procedimiento, la probabilidad de alcanzar las metas y los posibles problemas que puedan ocurrir kendell la recuperación.  Ellos también zurita discutido las alternativas razonables al procedimiento, incluso los riesgos, beneficios y efectos secundarios relacionados con las alternativas y los riesgos relacionados con no realizar medardo procedimiento.  Zurita respondido a todas mis preguntas y confirmo que no se ha dado ninguna garantía en cuanto a los resultados que pueda obtener.    En marianna de que surja la necesidad kendell mi operación o kendell el periodo postoperatorio, también autorizo se aplique mark y/o productos sanguíneos.  Asimismo, entiendo que a pesar de las cuidadosas pruebas y análisis de mark o de los productos sanguíneos que realizan las entidades  recolectoras, todavía puedo estar sujeto a efectos adversos lyn resultado de recibir ted transfusión de mark y/o productos sanguíneos.  A continuación se mencionan algunos, aunque no todos, los riesgos potenciales que pueden ocurrir: fiebre y reacciones alérgicas, reacciones hemolíticas, trasmisión de enfermedades lyn hepatitis, SIDA y citomegalovirus (CMV), así lyn sobrecarga de líquidos.   En marianna de que desee tener ted transfusión autóloga de mi propia mark o ted transfusión de un donante dirigido.  Lo discutiré con mi médico.  Check only if Refusing Blood or Blood Products  I understand refusal of blood or blood products as deemed necessary by my physician may have serious consequences to my condition to include possible death. I hereby assume responsibility for my refusal and release the hospital, its personnel, and my physicians from any responsibility for the consequences of my refusal.           o  Refuse       Autorizo el uso de cualquier muestra, órgano, tejido, parte del cuerpo u objeto extraño que pueda ser extraído de mi cuerpo kendell la operación/procedimiento para fines de diagnóstico, investigación o de enseñanza y main desecho posterior por las autoridades del hospital.  También, autorizo la revelación de los resultados de las pruebas de muestras y/o los informes escritos al médico tratante lyn personal médico del hospital u otros médicos de referencia o consulta involucrados en mi atención, a discreción del patólogo o de mi médico tratante.    Doy consentimiento para que se fotografíen o graben vídeos de las operaciones o procedimientos a realizarse, incluidas las partes de mi cuerpo que radha adecuadas para propósitos médicos, científicos o educativos, en el entendido de que, mi identidad no sea revelada por las fotografías o por textos descriptivos que las acompañen.  Si el procedimiento es fotografiado o grabado en vídeo, el cirujano obtendrá la imagen, cinta de vídeo o CD original.  El  hospital no se hará responsable por el almacenamiento, la revelación o el mantenimiento de la imagen, cinta o CD.    Autorizo la presencia de un especialista de producto u observadores en el quirófano, según lo considere necesario mi médico o las personas que éste designe.     Reconozco que en marianna de que mi procedimiento resulte en un tiempo prolongado de radiografía/fluoroscopia, puedo desarrollar ted reacción en la piel.    Si tengo ted orden de No intentar la reanimación (MERON), carmen estado se suspenderá mientras esté en el quirófano, la yvan de procedimientos y kendell el periodo de recuperación a menos que yo indique lo contrario explícitamente (o ted persona autorizada a bang el consentimiento en mi nombre). El cirujano o mi médico tratante determinarán cuándo termina el periodo de recuperación aplicable a los efectos de restablecer la orden de MERON.  Pacientes que se realizan un procedimiento de esterilización: Entiendo que si el procedimiento tiene éxito, los resultados serán permanentes y que, por lo tanto, me será imposible inseminar, concebir o tener hijos.  Asimismo, entiendo que el procedimiento tiene lyn propósito la esterilidad, aunque el resultado no está garantizado.   Admito que mi médico me ha explicado la aplicación de sedación/analgesia, incluidos los riesgos y beneficios y, consiento a la administración de sedación/analgesia conforme sea necesario o conveniente a juicio de mi médico.      CERTIFICO QUE HE LEÍDO Y COMPRENDIDO EL CONSENTIMIENTO ANTERIOR PARA LA OPERACIÓN y/o PROCEDIMIENTO.             ______________________________________  _______________________________  Firma del paciente      Firma de la persona responsible  Signature of patient      Signature of responsible person                        ___________________________________                                   Nombre en imprenta de la persona responsible         Name of responsible  person          ___________________________________                 Relación con el paciente         Relationship to patient    _________________________________________  ______________ ________________  Firma del testigo          Fecha / Date Hora / Time  Signature of witness    DECLARACÓN DEL MÉDICO Mediante mi firma al calce, afirmo que antes de la hora del procedimiento, he explicadoal paciente y/o a main representante legal, los riesgos y beneficios involucrados en el tratamiento propuesto, así comocualquier alternativa razonable al tratamiento propuesto. También le(s) he explicado los riesgos y beneficios involucradosen el rechazo del tratarniento propuesto y alternativas al tratamiento propuesto, y he respondido a las preguntas del(la) paciente(My signature below affirms that prior to the time of the procedure, I have explained to the patient and/or his/her guardian, therisks and benefits involved in the proposed treatment and any reasonable alternative to the proposed treatment. I have alsoexplained the risks and benefits involved in refusal of the proposed treatment and have answered the patient's questions.)    ________________________________________   _________________________   _____________   (Firma del médico/Signature of Physician)                                    (Fecha/Date)                                             (Hora/Time)      Patient Name: Judy Sahara     : 1980                 Printed: 2024      Medical Record #: [id number]                                              Page 2 of 2